# Patient Record
Sex: MALE | Race: WHITE | NOT HISPANIC OR LATINO | Employment: FULL TIME | URBAN - METROPOLITAN AREA
[De-identification: names, ages, dates, MRNs, and addresses within clinical notes are randomized per-mention and may not be internally consistent; named-entity substitution may affect disease eponyms.]

---

## 2023-04-10 LAB
ALANINE AMINOTRANSFERASE (SGPT) (U/L) IN SER/PLAS: 40 U/L (ref 10–52)
ALBUMIN (G/DL) IN SER/PLAS: 4.6 G/DL (ref 3.4–5)
ALKALINE PHOSPHATASE (U/L) IN SER/PLAS: 58 U/L (ref 33–120)
ANION GAP IN SER/PLAS: 11 MMOL/L (ref 10–20)
ASPARTATE AMINOTRANSFERASE (SGOT) (U/L) IN SER/PLAS: 26 U/L (ref 9–39)
BASOPHILS (10*3/UL) IN BLOOD BY AUTOMATED COUNT: 0.01 X10E9/L (ref 0–0.1)
BASOPHILS/100 LEUKOCYTES IN BLOOD BY AUTOMATED COUNT: 0.3 % (ref 0–2)
BILIRUBIN DIRECT (MG/DL) IN SER/PLAS: 0.1 MG/DL (ref 0–0.3)
BILIRUBIN TOTAL (MG/DL) IN SER/PLAS: 0.6 MG/DL (ref 0–1.2)
CALCIUM (MG/DL) IN SER/PLAS: 9.4 MG/DL (ref 8.6–10.6)
CARBON DIOXIDE, TOTAL (MMOL/L) IN SER/PLAS: 25 MMOL/L (ref 21–32)
CHLORIDE (MMOL/L) IN SER/PLAS: 106 MMOL/L (ref 98–107)
CHOLESTEROL (MG/DL) IN SER/PLAS: 177 MG/DL (ref 0–199)
CHOLESTEROL IN HDL (MG/DL) IN SER/PLAS: 71.8 MG/DL
CHOLESTEROL/HDL RATIO: 2.5
CREATININE (MG/DL) IN SER/PLAS: 0.85 MG/DL (ref 0.5–1.3)
EOSINOPHILS (10*3/UL) IN BLOOD BY AUTOMATED COUNT: 0.08 X10E9/L (ref 0–0.7)
EOSINOPHILS/100 LEUKOCYTES IN BLOOD BY AUTOMATED COUNT: 2.2 % (ref 0–6)
ERYTHROCYTE DISTRIBUTION WIDTH (RATIO) BY AUTOMATED COUNT: 12.2 % (ref 11.5–14.5)
ERYTHROCYTE MEAN CORPUSCULAR HEMOGLOBIN CONCENTRATION (G/DL) BY AUTOMATED: 33.9 G/DL (ref 32–36)
ERYTHROCYTE MEAN CORPUSCULAR VOLUME (FL) BY AUTOMATED COUNT: 102 FL (ref 80–100)
ERYTHROCYTES (10*6/UL) IN BLOOD BY AUTOMATED COUNT: 4.25 X10E12/L (ref 4.5–5.9)
GFR MALE: >90 ML/MIN/1.73M2
GLUCOSE (MG/DL) IN SER/PLAS: 82 MG/DL (ref 74–99)
HEMATOCRIT (%) IN BLOOD BY AUTOMATED COUNT: 43.3 % (ref 41–52)
HEMOGLOBIN (G/DL) IN BLOOD: 14.7 G/DL (ref 13.5–17.5)
IMMATURE GRANULOCYTES/100 LEUKOCYTES IN BLOOD BY AUTOMATED COUNT: 0.5 % (ref 0–0.9)
LDL: 97 MG/DL (ref 0–99)
LEUKOCYTES (10*3/UL) IN BLOOD BY AUTOMATED COUNT: 3.6 X10E9/L (ref 4.4–11.3)
LYMPHOCYTES (10*3/UL) IN BLOOD BY AUTOMATED COUNT: 0.94 X10E9/L (ref 1.2–4.8)
LYMPHOCYTES/100 LEUKOCYTES IN BLOOD BY AUTOMATED COUNT: 25.8 % (ref 13–44)
MONOCYTES (10*3/UL) IN BLOOD BY AUTOMATED COUNT: 0.48 X10E9/L (ref 0.1–1)
MONOCYTES/100 LEUKOCYTES IN BLOOD BY AUTOMATED COUNT: 13.2 % (ref 2–10)
NEUTROPHILS (10*3/UL) IN BLOOD BY AUTOMATED COUNT: 2.11 X10E9/L (ref 1.2–7.7)
NEUTROPHILS/100 LEUKOCYTES IN BLOOD BY AUTOMATED COUNT: 58 % (ref 40–80)
NRBC (PER 100 WBCS) BY AUTOMATED COUNT: 0 /100 WBC (ref 0–0)
PARATHYRIN INTACT (PG/ML) IN SER/PLAS: 40.5 PG/ML (ref 18.5–88)
PLATELETS (10*3/UL) IN BLOOD AUTOMATED COUNT: 184 X10E9/L (ref 150–450)
POTASSIUM (MMOL/L) IN SER/PLAS: 4.3 MMOL/L (ref 3.5–5.3)
PROTEIN TOTAL: 7.5 G/DL (ref 6.4–8.2)
SODIUM (MMOL/L) IN SER/PLAS: 138 MMOL/L (ref 136–145)
THYROTROPIN (MIU/L) IN SER/PLAS BY DETECTION LIMIT <= 0.05 MIU/L: 1.29 MIU/L (ref 0.44–3.98)
THYROXINE (T4) FREE (NG/DL) IN SER/PLAS: 1.73 NG/DL (ref 0.78–1.48)
TRIGLYCERIDE (MG/DL) IN SER/PLAS: 41 MG/DL (ref 0–149)
TRIIODOTHYRONINE (T3) FREE (PG/ML) IN SER/PLAS: 4 PG/ML (ref 2.3–4.2)
UREA NITROGEN (MG/DL) IN SER/PLAS: 19 MG/DL (ref 6–23)
VLDL: 8 MG/DL (ref 0–40)

## 2023-04-13 LAB
NIL(NEG) CONTROL SPOT COUNT: NORMAL
PANEL A SPOT COUNT: 0
PANEL B SPOT COUNT: 0
POS CONTROL SPOT COUNT: NORMAL
T-SPOT. TB INTERPRETATION: NEGATIVE

## 2023-04-22 LAB — THYROID STIMULATING IMMUNOGLOBULIN: 3.3 TSI INDEX

## 2023-10-22 PROBLEM — K75.4 AUTOIMMUNE HEPATITIS (MULTI): Status: ACTIVE | Noted: 2023-10-22

## 2023-10-22 PROBLEM — L30.9 DERMATITIS: Status: ACTIVE | Noted: 2023-10-22

## 2023-10-22 PROBLEM — E05.00 GRAVES' ORBITOPATHY: Status: ACTIVE | Noted: 2023-10-22

## 2023-10-22 PROBLEM — E55.9 VITAMIN D DEFICIENCY, UNSPECIFIED: Status: ACTIVE | Noted: 2023-10-22

## 2023-10-22 PROBLEM — K52.9 NONSPECIFIC COLITIS: Status: ACTIVE | Noted: 2023-10-22

## 2023-10-22 PROBLEM — K21.9 GERD (GASTROESOPHAGEAL REFLUX DISEASE): Status: ACTIVE | Noted: 2023-10-22

## 2023-10-22 PROBLEM — R79.89 ELEVATED SERUM CREATININE: Status: ACTIVE | Noted: 2023-10-22

## 2023-10-22 PROBLEM — K51.90 ULCERATIVE COLITIS (MULTI): Status: ACTIVE | Noted: 2023-10-22

## 2023-10-22 PROBLEM — E05.00 GRAVES DISEASE: Status: ACTIVE | Noted: 2023-10-22

## 2023-10-22 PROBLEM — E05.90 HYPERTHYROIDISM: Status: ACTIVE | Noted: 2023-10-22

## 2023-10-22 PROBLEM — R17 JAUNDICE: Status: ACTIVE | Noted: 2023-10-22

## 2023-10-22 PROBLEM — D50.0 IRON DEFICIENCY ANEMIA DUE TO CHRONIC BLOOD LOSS: Status: ACTIVE | Noted: 2023-10-22

## 2023-10-22 PROBLEM — L03.011: Status: ACTIVE | Noted: 2023-10-22

## 2023-10-22 PROBLEM — E89.0 HYPOTHYROIDISM, POSTABLATIVE: Status: ACTIVE | Noted: 2023-10-22

## 2023-10-22 PROBLEM — K52.9 CHRONIC DIARRHEA: Status: ACTIVE | Noted: 2023-10-22

## 2023-10-22 RX ORDER — CHOLECALCIFEROL (VITAMIN D3) 125 MCG
1 CAPSULE ORAL DAILY
COMMUNITY
Start: 2021-09-28 | End: 2024-04-24 | Stop reason: SDUPTHER

## 2023-10-22 RX ORDER — HYDROGEN PEROXIDE 3 %
1 SOLUTION, NON-ORAL MISCELLANEOUS DAILY
COMMUNITY
Start: 2020-11-12

## 2023-10-22 RX ORDER — LEVOTHYROXINE SODIUM 150 UG/1
1 TABLET ORAL DAILY
COMMUNITY
Start: 2022-03-21

## 2023-10-22 RX ORDER — ASCORBIC ACID 125 MG
2 TABLET,CHEWABLE ORAL DAILY
COMMUNITY
Start: 2019-02-12

## 2023-10-22 RX ORDER — PREDNISONE 1 MG/1
3 TABLET ORAL
COMMUNITY
Start: 2022-03-24 | End: 2024-03-18

## 2023-10-22 RX ORDER — ASPIRIN 325 MG
1000 TABLET, DELAYED RELEASE (ENTERIC COATED) ORAL
COMMUNITY
Start: 2019-02-12 | End: 2023-10-24 | Stop reason: WASHOUT

## 2023-10-22 RX ORDER — ACETAMINOPHEN 500 MG
TABLET ORAL
COMMUNITY
End: 2023-10-24 | Stop reason: WASHOUT

## 2023-10-22 RX ORDER — MERCAPTOPURINE 50 MG/1
TABLET ORAL
COMMUNITY
Start: 2020-06-10 | End: 2023-12-11

## 2023-10-22 RX ORDER — LOPERAMIDE HYDROCHLORIDE 2 MG/1
CAPSULE ORAL
COMMUNITY
Start: 2022-07-26 | End: 2023-10-24 | Stop reason: WASHOUT

## 2023-10-24 ENCOUNTER — OFFICE VISIT (OUTPATIENT)
Dept: GASTROENTEROLOGY | Facility: HOSPITAL | Age: 28
End: 2023-10-24
Payer: COMMERCIAL

## 2023-10-24 VITALS
OXYGEN SATURATION: 95 % | HEIGHT: 69 IN | WEIGHT: 188.7 LBS | HEART RATE: 65 BPM | RESPIRATION RATE: 16 BRPM | SYSTOLIC BLOOD PRESSURE: 120 MMHG | DIASTOLIC BLOOD PRESSURE: 79 MMHG | BODY MASS INDEX: 27.95 KG/M2 | TEMPERATURE: 97.6 F

## 2023-10-24 DIAGNOSIS — K51.00 ULCERATIVE PANCOLITIS WITHOUT COMPLICATION (MULTI): Primary | ICD-10-CM

## 2023-10-24 DIAGNOSIS — R10.30 LOWER ABDOMINAL PAIN: ICD-10-CM

## 2023-10-24 PROCEDURE — 1036F TOBACCO NON-USER: CPT | Performed by: INTERNAL MEDICINE

## 2023-10-24 PROCEDURE — 99214 OFFICE O/P EST MOD 30 MIN: CPT | Performed by: INTERNAL MEDICINE

## 2023-10-24 RX ORDER — UPADACITINIB 30 MG/1
TABLET, EXTENDED RELEASE ORAL
COMMUNITY
Start: 2023-09-11 | End: 2023-12-12

## 2023-10-24 SDOH — ECONOMIC STABILITY: FOOD INSECURITY: WITHIN THE PAST 12 MONTHS, YOU WORRIED THAT YOUR FOOD WOULD RUN OUT BEFORE YOU GOT MONEY TO BUY MORE.: NEVER TRUE

## 2023-10-24 SDOH — ECONOMIC STABILITY: FOOD INSECURITY: WITHIN THE PAST 12 MONTHS, THE FOOD YOU BOUGHT JUST DIDN'T LAST AND YOU DIDN'T HAVE MONEY TO GET MORE.: NEVER TRUE

## 2023-10-24 ASSESSMENT — LIFESTYLE VARIABLES
HOW OFTEN DO YOU HAVE A DRINK CONTAINING ALCOHOL: NEVER
AUDIT-C TOTAL SCORE: 0
SKIP TO QUESTIONS 9-10: 1
HOW MANY STANDARD DRINKS CONTAINING ALCOHOL DO YOU HAVE ON A TYPICAL DAY: PATIENT DOES NOT DRINK
HOW OFTEN DO YOU HAVE SIX OR MORE DRINKS ON ONE OCCASION: NEVER

## 2023-10-24 ASSESSMENT — PATIENT HEALTH QUESTIONNAIRE - PHQ9
SUM OF ALL RESPONSES TO PHQ9 QUESTIONS 1 AND 2: 0
2. FEELING DOWN, DEPRESSED OR HOPELESS: NOT AT ALL
1. LITTLE INTEREST OR PLEASURE IN DOING THINGS: NOT AT ALL

## 2023-10-24 ASSESSMENT — PAIN SCALES - GENERAL: PAINLEVEL: 0-NO PAIN

## 2023-10-24 ASSESSMENT — ENCOUNTER SYMPTOMS
OCCASIONAL FEELINGS OF UNSTEADINESS: 0
DEPRESSION: 0
LOSS OF SENSATION IN FEET: 0

## 2023-10-24 NOTE — PATIENT INSTRUCTIONS
Thank you for coming in for follow-up.  It's great that you feel well on Rinvoq.  As discussed today:    1) check labs  2) check stool fecal calprotectin  3) schedule colonoscopy  4) continue Rinvoq 30 mg daily  5) follow-up with Dr. Owne for your autoimmune hepatitis  6) will plan follow-up 6 months after your colonoscopy; call the office with any worsening GI symptoms

## 2023-10-24 NOTE — PROGRESS NOTES
"Subjective   Patient ID: Rubén Hernández is a 28 y.o. male who presents for Ulcerative Colitis    HPI  Follow up of 28 year man with autoimmune hepatitis, listed for a liver transplant since 7/2016 and IBD colitis, felt to be ulcerative colitis (12/2020).    (+) thyroiditis-->Graves disease    5/2020 IBD [facility]GI c/w IBD - Ulcerative Colitis (IFA Perinuclear Pattern Detected and DNAse Sensitive).     12/2020 colonoscopy w/ congestion, erosions, erythema, granularity, and friability throughout the colon. Path showed a chronic active pancolitis.    2/2021 started Entyvio-->only partial response; never achieved disease control on every 4 week Entyvio.     9/2021 changed to infliximab with initial response to induction, but then loss of benefit; dose increased to 10 mg/kg every 8 weeks. Also on 6MP 75 mg daily for liver disease.     7/2022 still symptomatic; Anser IFX 2.6 and FCP 1370 on 10 mg/kg.  Moved to Q6 wks, but never responded.    Changed to Rinvoq 1/2023 and improved.  BMS  3 times/day solid with no nocturnal stools    (+) shingrix vaccine first dose in Dec 22.    In follow-up today, doing well.  \"Everything is a lot better since starting Rinvoq#.  Weight up 9 lbs.  Stools 3 times daily; very close to his pre-disease baseline of 2-3 daily.  No blood in stools.  Stools formed.  No nocturnal stools.  No urgency better.  Eating whatever he wants.  Still on 6MP 75 mg and prednisone 3 mg daily.   On Nexium 20 mg daily; takes for JODY related to thyroxine.    Tolerating Rinvoq without issues.  Has had both ShingRx vaccines.    Review of Systems  Complete review of systems otherwise negative per complaint     Objective   /79   Pulse 65   Temp 36.4 °C (97.6 °F)   Resp 16   Ht 1.74 m (5' 8.5\")   Wt 85.6 kg (188 lb 11.2 oz)   SpO2 95%   BMI 28.27 kg/m²    Vital signs reviewed  Patient alert and oriented in no acute distress  Anicteric  No cervical adenopathy  Cardiac exam regular rate and rhythm S1-S2 without " murmurs gallops or rubs  Lungs clear to auscultation bilaterally  Abdomen soft and nontender without organomegaly or mass.  No rebound or guarding.  Bowel sounds present  Extremities without edema  Neurologically grossly intact     Assessment/Plan   #UC- now on upadacitinib for 10 months and clinicall y in remission.  Will check labs and colonoscopy along with FCP.  Risks of therapy reviewed with him.  Will follow-up 6 months after colonoscopy.    #AIH- continues of low dose prednisone and 6MP 75 mg daily.  Needs F/U with Dr. Woodson

## 2023-11-20 ENCOUNTER — LAB (OUTPATIENT)
Dept: LAB | Facility: LAB | Age: 28
End: 2023-11-20
Payer: COMMERCIAL

## 2023-11-20 DIAGNOSIS — K51.00 ULCERATIVE PANCOLITIS WITHOUT COMPLICATION (MULTI): ICD-10-CM

## 2023-11-20 LAB
ALBUMIN SERPL BCP-MCNC: 4.7 G/DL (ref 3.4–5)
ALP SERPL-CCNC: 54 U/L (ref 33–120)
ALT SERPL W P-5'-P-CCNC: 45 U/L (ref 10–52)
AST SERPL W P-5'-P-CCNC: 31 U/L (ref 9–39)
BASOPHILS # BLD AUTO: 0.02 X10*3/UL (ref 0–0.1)
BASOPHILS NFR BLD AUTO: 0.4 %
BILIRUB DIRECT SERPL-MCNC: 0.1 MG/DL (ref 0–0.3)
BILIRUB SERPL-MCNC: 0.8 MG/DL (ref 0–1.2)
CRP SERPL-MCNC: <0.1 MG/DL
EOSINOPHIL # BLD AUTO: 0.11 X10*3/UL (ref 0–0.7)
EOSINOPHIL NFR BLD AUTO: 2.3 %
ERYTHROCYTE [DISTWIDTH] IN BLOOD BY AUTOMATED COUNT: 13.2 % (ref 11.5–14.5)
HCT VFR BLD AUTO: 46.3 % (ref 41–52)
HGB BLD-MCNC: 15.6 G/DL (ref 13.5–17.5)
IMM GRANULOCYTES # BLD AUTO: 0.02 X10*3/UL (ref 0–0.7)
IMM GRANULOCYTES NFR BLD AUTO: 0.4 % (ref 0–0.9)
LYMPHOCYTES # BLD AUTO: 1.11 X10*3/UL (ref 1.2–4.8)
LYMPHOCYTES NFR BLD AUTO: 23.7 %
MCH RBC QN AUTO: 34.2 PG (ref 26–34)
MCHC RBC AUTO-ENTMCNC: 33.7 G/DL (ref 32–36)
MCV RBC AUTO: 102 FL (ref 80–100)
MONOCYTES # BLD AUTO: 0.48 X10*3/UL (ref 0.1–1)
MONOCYTES NFR BLD AUTO: 10.2 %
NEUTROPHILS # BLD AUTO: 2.95 X10*3/UL (ref 1.2–7.7)
NEUTROPHILS NFR BLD AUTO: 63 %
NRBC BLD-RTO: 0 /100 WBCS (ref 0–0)
PLATELET # BLD AUTO: 211 X10*3/UL (ref 150–450)
PROT SERPL-MCNC: 7.4 G/DL (ref 6.4–8.2)
RBC # BLD AUTO: 4.56 X10*6/UL (ref 4.5–5.9)
WBC # BLD AUTO: 4.7 X10*3/UL (ref 4.4–11.3)

## 2023-11-20 PROCEDURE — 36415 COLL VENOUS BLD VENIPUNCTURE: CPT

## 2023-11-20 PROCEDURE — 86140 C-REACTIVE PROTEIN: CPT

## 2023-11-20 PROCEDURE — 80076 HEPATIC FUNCTION PANEL: CPT

## 2023-11-20 PROCEDURE — 85025 COMPLETE CBC W/AUTO DIFF WBC: CPT

## 2023-12-08 DIAGNOSIS — K75.4 AUTOIMMUNE HEPATITIS (MULTI): ICD-10-CM

## 2023-12-11 RX ORDER — MERCAPTOPURINE 50 MG/1
TABLET ORAL
Qty: 45 TABLET | Refills: 5 | Status: SHIPPED | OUTPATIENT
Start: 2023-12-11

## 2023-12-12 DIAGNOSIS — K51.014: ICD-10-CM

## 2023-12-12 DIAGNOSIS — K51.018 ULCERATIVE PANCOLITIS WITH OTHER COMPLICATION (MULTI): Primary | ICD-10-CM

## 2023-12-12 RX ORDER — UPADACITINIB 30 MG/1
30 TABLET, EXTENDED RELEASE ORAL DAILY
Qty: 90 TABLET | Refills: 1 | Status: SHIPPED | OUTPATIENT
Start: 2023-12-12 | End: 2024-05-24

## 2023-12-19 ENCOUNTER — OFFICE VISIT (OUTPATIENT)
Dept: GASTROENTEROLOGY | Facility: CLINIC | Age: 28
End: 2023-12-19
Payer: COMMERCIAL

## 2023-12-19 VITALS
HEART RATE: 65 BPM | DIASTOLIC BLOOD PRESSURE: 78 MMHG | WEIGHT: 181 LBS | SYSTOLIC BLOOD PRESSURE: 122 MMHG | HEIGHT: 68 IN | TEMPERATURE: 96.3 F | BODY MASS INDEX: 27.43 KG/M2

## 2023-12-19 DIAGNOSIS — K75.4 AUTOIMMUNE HEPATITIS (MULTI): ICD-10-CM

## 2023-12-19 PROCEDURE — 99213 OFFICE O/P EST LOW 20 MIN: CPT | Performed by: INTERNAL MEDICINE

## 2023-12-19 PROCEDURE — 1036F TOBACCO NON-USER: CPT | Performed by: INTERNAL MEDICINE

## 2023-12-19 ASSESSMENT — ENCOUNTER SYMPTOMS
COUGH: 0
APPETITE CHANGE: 0
ABDOMINAL PAIN: 0
SEIZURES: 0
SHORTNESS OF BREATH: 0
RESPIRATORY NEGATIVE: 1
FATIGUE: 0
SPEECH DIFFICULTY: 0

## 2023-12-19 NOTE — PATIENT INSTRUCTIONS
[phone] (Please see Department name below when scheduling)    Please have the following done:    [x]  LABS: due date : May   [x]  IMAGING (Department Radiology) RADIOLOGY: Ultrasound: due date : Now   [x]  Liver Elastography aka Fibroscan due date : Now   Scheduling [phone] (Department Gastro)  Type of liver elastography.  A special ultrasound that measures scarring (fibrosis) and fat (steatosis) in the liver.   The Fibroscan is located at the following locations: [facility], [facility], [facility] (This test is not done in radiology)  PLEASE DO NOT EAT OR DRINK 3 HOURS BEFORE YOUR EXAM  [x]   FOLLOW UP  (Department Gastro): due date :      If you have any questions or need assistance, please don't hesitate to contact us.   Dr. Owen: Office [phone] Fax: [phone]  Hepatology Nurse Coordinator: Sandra MATHEWS [phone]

## 2023-12-19 NOTE — ASSESSMENT & PLAN NOTE
He will continue on current dose of meds for AIH  He will have follow up ultrasound and fibroscan  We will see him in 6 months with blood work

## 2023-12-19 NOTE — PROGRESS NOTES
Subjective    He is doing very well especially from a bowel standpoint. He remains in biochemical remission.    Review of Systems   Constitutional:  Negative for appetite change and fatigue.   HENT: Negative.     Respiratory: Negative.  Negative for cough and shortness of breath.    Gastrointestinal:  Negative for abdominal pain.   Endocrine: Negative for cold intolerance.   Skin: Negative.    Neurological:  Negative for seizures and speech difficulty.       Physical Exam  Constitutional:       Appearance: Normal appearance.   HENT:      Head: Normocephalic and atraumatic.      Nose: No congestion or rhinorrhea.   Cardiovascular:      Rate and Rhythm: Normal rate and regular rhythm.   Pulmonary:      Effort: Pulmonary effort is normal.      Breath sounds: Normal breath sounds.   Abdominal:      General: Abdomen is flat.      Palpations: Abdomen is soft.   Musculoskeletal:         General: No tenderness.      Cervical back: No rigidity or tenderness.      Right lower leg: No edema.   Skin:     Coloration: Skin is not jaundiced.      Findings: No erythema.   Neurological:      General: No focal deficit present.      Mental Status: He is alert. Mental status is at baseline.   Psychiatric:         Mood and Affect: Mood normal.         Behavior: Behavior normal.     === 02/15/22 ===    MR LIVER WO AND W CONTRAST    - Impression -  1.  No left hepatic lobe lesion identified to explain the echogenic  focus seen on the recent ultrasound.  2. Mild liver contour nodularity, in keeping with history of chronic  liver disease. No evidence for portal hypertension.    I personally reviewed the images/study and I agree with resident Dr. Margy Andrew's findings as stated.  Lab Results   Component Value Date    ALT 45 11/20/2023    AST 31 11/20/2023    ALKPHOS 54 11/20/2023    BILITOT 0.8 11/20/2023         Autoimmune hepatitis (CMS/HCC)  He will continue on current dose of meds for AIH  He will have follow up ultrasound and  fibroscan  We will see him in 6 months with blood work

## 2024-01-12 ENCOUNTER — APPOINTMENT (OUTPATIENT)
Dept: ENDOCRINOLOGY | Facility: CLINIC | Age: 29
End: 2024-01-12
Payer: COMMERCIAL

## 2024-01-15 ENCOUNTER — HOSPITAL ENCOUNTER (OUTPATIENT)
Dept: RADIOLOGY | Facility: HOSPITAL | Age: 29
Discharge: HOME | End: 2024-01-15
Payer: COMMERCIAL

## 2024-01-15 DIAGNOSIS — K75.4 AUTOIMMUNE HEPATITIS (MULTI): ICD-10-CM

## 2024-01-15 PROCEDURE — 76705 ECHO EXAM OF ABDOMEN: CPT

## 2024-01-15 PROCEDURE — 76705 ECHO EXAM OF ABDOMEN: CPT | Performed by: RADIOLOGY

## 2024-01-23 ENCOUNTER — APPOINTMENT (OUTPATIENT)
Dept: GASTROENTEROLOGY | Facility: CLINIC | Age: 29
End: 2024-01-23
Payer: COMMERCIAL

## 2024-02-05 DIAGNOSIS — K51.00 ULCERATIVE PANCOLITIS WITHOUT COMPLICATION (MULTI): Primary | ICD-10-CM

## 2024-03-18 DIAGNOSIS — K75.4 AUTOIMMUNE HEPATITIS (MULTI): ICD-10-CM

## 2024-03-18 RX ORDER — PREDNISONE 1 MG/1
4 TABLET ORAL DAILY
Qty: 120 TABLET | Refills: 11 | Status: SHIPPED | OUTPATIENT
Start: 2024-03-18

## 2024-04-08 ENCOUNTER — LAB (OUTPATIENT)
Dept: LAB | Facility: LAB | Age: 29
End: 2024-04-08
Payer: COMMERCIAL

## 2024-04-08 DIAGNOSIS — K75.4 AUTOIMMUNE HEPATITIS TREATED WITH STEROIDS (MULTI): Primary | ICD-10-CM

## 2024-04-08 DIAGNOSIS — K75.4 AUTOIMMUNE HEPATITIS (MULTI): ICD-10-CM

## 2024-04-08 LAB
ALBUMIN SERPL BCP-MCNC: 4.5 G/DL (ref 3.4–5)
ALP SERPL-CCNC: 48 U/L (ref 33–120)
ALT SERPL W P-5'-P-CCNC: 55 U/L (ref 10–52)
ANION GAP SERPL CALC-SCNC: 11 MMOL/L (ref 10–20)
AST SERPL W P-5'-P-CCNC: 30 U/L (ref 9–39)
BASOPHILS # BLD AUTO: 0.01 X10*3/UL (ref 0–0.1)
BASOPHILS NFR BLD AUTO: 0.3 %
BILIRUB DIRECT SERPL-MCNC: 0.2 MG/DL (ref 0–0.3)
BILIRUB SERPL-MCNC: 1.4 MG/DL (ref 0–1.2)
BUN SERPL-MCNC: 14 MG/DL (ref 6–23)
CALCIUM SERPL-MCNC: 9 MG/DL (ref 8.6–10.3)
CHLORIDE SERPL-SCNC: 105 MMOL/L (ref 98–107)
CO2 SERPL-SCNC: 26 MMOL/L (ref 21–32)
CREAT SERPL-MCNC: 0.89 MG/DL (ref 0.5–1.3)
EGFRCR SERPLBLD CKD-EPI 2021: >90 ML/MIN/1.73M*2
EOSINOPHIL # BLD AUTO: 0.09 X10*3/UL (ref 0–0.7)
EOSINOPHIL NFR BLD AUTO: 2.4 %
ERYTHROCYTE [DISTWIDTH] IN BLOOD BY AUTOMATED COUNT: 12.8 % (ref 11.5–14.5)
GLUCOSE SERPL-MCNC: 80 MG/DL (ref 74–99)
HCT VFR BLD AUTO: 44.7 % (ref 41–52)
HGB BLD-MCNC: 15.1 G/DL (ref 13.5–17.5)
IMM GRANULOCYTES # BLD AUTO: 0.01 X10*3/UL (ref 0–0.7)
IMM GRANULOCYTES NFR BLD AUTO: 0.3 % (ref 0–0.9)
LYMPHOCYTES # BLD AUTO: 0.95 X10*3/UL (ref 1.2–4.8)
LYMPHOCYTES NFR BLD AUTO: 24.9 %
MCH RBC QN AUTO: 34.2 PG (ref 26–34)
MCHC RBC AUTO-ENTMCNC: 33.8 G/DL (ref 32–36)
MCV RBC AUTO: 101 FL (ref 80–100)
MONOCYTES # BLD AUTO: 0.69 X10*3/UL (ref 0.1–1)
MONOCYTES NFR BLD AUTO: 18.1 %
NEUTROPHILS # BLD AUTO: 2.06 X10*3/UL (ref 1.2–7.7)
NEUTROPHILS NFR BLD AUTO: 54 %
NRBC BLD-RTO: 0 /100 WBCS (ref 0–0)
PLATELET # BLD AUTO: 203 X10*3/UL (ref 150–450)
POTASSIUM SERPL-SCNC: 4 MMOL/L (ref 3.5–5.3)
PROT SERPL-MCNC: 6.9 G/DL (ref 6.4–8.2)
RBC # BLD AUTO: 4.41 X10*6/UL (ref 4.5–5.9)
SODIUM SERPL-SCNC: 138 MMOL/L (ref 136–145)
WBC # BLD AUTO: 3.8 X10*3/UL (ref 4.4–11.3)

## 2024-04-08 PROCEDURE — 85025 COMPLETE CBC W/AUTO DIFF WBC: CPT

## 2024-04-08 PROCEDURE — 82248 BILIRUBIN DIRECT: CPT

## 2024-04-08 PROCEDURE — 36415 COLL VENOUS BLD VENIPUNCTURE: CPT

## 2024-04-08 PROCEDURE — 80053 COMPREHEN METABOLIC PANEL: CPT

## 2024-04-09 DIAGNOSIS — E05.00 GRAVES DISEASE: Primary | ICD-10-CM

## 2024-04-11 ENCOUNTER — LAB (OUTPATIENT)
Dept: LAB | Facility: LAB | Age: 29
End: 2024-04-11
Payer: COMMERCIAL

## 2024-04-11 DIAGNOSIS — E05.00 GRAVES DISEASE: ICD-10-CM

## 2024-04-11 LAB
ALBUMIN SERPL BCP-MCNC: 4.7 G/DL (ref 3.4–5)
ALP SERPL-CCNC: 51 U/L (ref 33–120)
ALT SERPL W P-5'-P-CCNC: 54 U/L (ref 10–52)
ANION GAP SERPL CALC-SCNC: 13 MMOL/L (ref 10–20)
AST SERPL W P-5'-P-CCNC: 31 U/L (ref 9–39)
BILIRUB SERPL-MCNC: 0.9 MG/DL (ref 0–1.2)
BUN SERPL-MCNC: 16 MG/DL (ref 6–23)
CALCIUM SERPL-MCNC: 9.9 MG/DL (ref 8.6–10.3)
CHLORIDE SERPL-SCNC: 105 MMOL/L (ref 98–107)
CO2 SERPL-SCNC: 26 MMOL/L (ref 21–32)
CREAT SERPL-MCNC: 0.96 MG/DL (ref 0.5–1.3)
EGFRCR SERPLBLD CKD-EPI 2021: >90 ML/MIN/1.73M*2
GLUCOSE SERPL-MCNC: 92 MG/DL (ref 74–99)
POTASSIUM SERPL-SCNC: 4.4 MMOL/L (ref 3.5–5.3)
PROT SERPL-MCNC: 7.3 G/DL (ref 6.4–8.2)
PTH-INTACT SERPL-MCNC: 46.8 PG/ML (ref 18.5–88)
SODIUM SERPL-SCNC: 140 MMOL/L (ref 136–145)
T4 FREE SERPL-MCNC: 1.45 NG/DL (ref 0.61–1.12)
TSH SERPL-ACNC: 0.05 MIU/L (ref 0.44–3.98)

## 2024-04-11 PROCEDURE — 84445 ASSAY OF TSI GLOBULIN: CPT

## 2024-04-11 PROCEDURE — 80053 COMPREHEN METABOLIC PANEL: CPT

## 2024-04-11 PROCEDURE — 84443 ASSAY THYROID STIM HORMONE: CPT

## 2024-04-11 PROCEDURE — 83970 ASSAY OF PARATHORMONE: CPT

## 2024-04-11 PROCEDURE — 84439 ASSAY OF FREE THYROXINE: CPT

## 2024-04-11 PROCEDURE — 36415 COLL VENOUS BLD VENIPUNCTURE: CPT

## 2024-04-12 ENCOUNTER — OFFICE VISIT (OUTPATIENT)
Dept: ENDOCRINOLOGY | Facility: CLINIC | Age: 29
End: 2024-04-12
Payer: COMMERCIAL

## 2024-04-12 VITALS
HEIGHT: 69 IN | WEIGHT: 189 LBS | DIASTOLIC BLOOD PRESSURE: 76 MMHG | HEART RATE: 61 BPM | SYSTOLIC BLOOD PRESSURE: 124 MMHG | BODY MASS INDEX: 27.99 KG/M2

## 2024-04-12 DIAGNOSIS — E05.00 GRAVES DISEASE: Primary | ICD-10-CM

## 2024-04-12 DIAGNOSIS — E03.8 OTHER SPECIFIED HYPOTHYROIDISM: ICD-10-CM

## 2024-04-12 PROCEDURE — 1036F TOBACCO NON-USER: CPT | Performed by: INTERNAL MEDICINE

## 2024-04-12 PROCEDURE — 99214 OFFICE O/P EST MOD 30 MIN: CPT | Performed by: INTERNAL MEDICINE

## 2024-04-12 ASSESSMENT — PAIN SCALES - GENERAL: PAINLEVEL: 0-NO PAIN

## 2024-04-12 NOTE — PROGRESS NOTES
29 year old patient with medical history significant for Ulcerative Colitis [in remission], Autoimmune Hepatitis (dx 2016 currently managed with prednisone 3mg daily and mercaptopurine?), and hx of Graves disease s/p VILLAVICENCIO ablation and post ablative hypothyroidism, Graves' orbitopathy well-controlled, osteopenia.    Presents today for a routine follow up. Last OV was in April 2023.      Interval Hx :  --------------  The patient reports feeling well overall and has further improvement in his eye sx; minimal watering.   Denies itching or redness of the eyes.   Denies any changes in vision, visual deficits or double vision.   Using eye drops (gell at night and PRN during the day) , sleeping propped up, avoiding salt and smoke, using sunglasses outdoors.  Patient reports having good energy, denies palpitations excessive sweating tremors or muscle cramps.  No diarrhea ,weight is stable.     Reports compliance with levothyroxine 150 mcg qd at appropriate time of the day. (took T4 1.5-2 hours before the test on April 11)  Taking Vitamin D dose: 5000 units once daily.        Pertinent History of Thyroid Disease:  -----------------------------------------  Briefly:  Patient was diagnosed with primary hypothyroidism without elevated TPO antibodies in 2018 and was started on levothyroxine replacement therapy. He subsequently developed hyperthyroidism with elevated TSI antibodies in February 2020 without eye symptoms. Initiation of methimazole for Graves disease lead to acute transaminitis requiring discontinuation of methimazole followed by radioactive iodine ablation therapy on 5/28/20.     Detailed history:  03/2018: Diagnosed with primary hypothyroidism during inpatient admission for facial swelling. Labs at that time showed TSH of 125.27 with free T4 of less than 0.10 ng/dL (TPO antibody level was 21 IU/ml and TSI was less than 1.0). He was started initially on levothyroxine 125 mcg, dose was titrated up to 150 mcg daily in  June 2018.     02/2020: TFTs showing a TSH: 0.01, and FT4:1.81. KU3ncbc decreased from 125 to 88mcg PO QD.     03/2020: TSH: <0.01, with elevated FT4 of 2.88. Patient symptomatic insomnia, frequent bowel movements and racing heart/palpitations. Levothyroxine was discontinued.     04/2020: Despite discontinuation of LT4 patient remained clinically and biochemically hyperthyroid. TSH of 0.01 and elevated free T4 of 2.23 ng/dL. Patient was started on methimazole 10 mg BID during the last week of April 2020.   Shortly after starting MMI liver enzymes were noted to be elevated, and continued to trend up after repeat testing. Methimazole was discontinued approximately on May 5, 2020 (about 2-3 weeks after starting the medication).     05/2020: Patient admitted (5/18/20) due to worsening transaminitis; patient however remained asymptomatic. Diagnostic radioactive iodine uptake and scan was done on 5/21/20 that showed homogenous increased activity throughout the thyroid gland bilaterally with uptake of 27.4%. Liver biopsy done during recent hospitalization showed evidence of drug induced hepatotoxicity. He was started on IV glucocorticoids and he was discharged home on prednisone 80 mg daily. After discharge patient underwent therapeutic VILLAVICENCIO ablation (5/28/20); with 21.4 millicuries.      07/2020: Post VILLAVICENCIO ablative therapy TFTs showed TSH of 90.11 mIU/L and free T4 of 0.39 ng/dL and patient was started on levothyroxine 112 mcg daily on 7/8/20.      08/2020: Repeat TFTs: TSH: 16, FT4:1.16, LT4 increased to 150mcg PO daily.     10/2020: F/u in clinic, continued on LT4 150mcg PO daily. TFT: TSH:0.41, FT4:1.2     Social Hx:  - .  - Lives with girlfriend.  - Denies any ETOH, Tobacco, or recreational drug use.  - Walks 1/2 to 1 mile daily.       Current Outpatient Medications on File Prior to Visit   Medication Sig Dispense Refill    ascorbic acid (Vitamin C) 125 mg chewable tablet Chew 2 tablets (250 mg) once  "daily.      cholecalciferol (Vitamin D-3) 125 MCG (5000 UT) capsule Take 1 capsule (125 mcg) by mouth once daily.      esomeprazole (NexIUM) 20 mg DR capsule Take 1 capsule (20 mg) by mouth once daily.      levothyroxine (Synthroid, Levoxyl) 150 mcg tablet Take 1 tablet (150 mcg) by mouth once daily.      mercaptopurine (Purinethol) 50 mg tablet TAKE 1 AND 1/2 TABLETS BY MOUTH DAILY 45 tablet 5    predniSONE (Deltasone) 1 mg tablet TAKE 4 TABLETS BY MOUTH EVERY  tablet 11    Rinvoq 30 mg tablet extended release 24 hr tablet TAKE 1 TABLET DAILY 90 tablet 1     No current facility-administered medications on file prior to visit.        Visit Vitals  /76   Pulse 61   Ht 1.753 m (5' 9\")   Wt 85.7 kg (189 lb)   BMI 27.91 kg/m²   Smoking Status Never   BSA 2.04 m²        PE:  Constitutional: well-developed, well-nourished,  , in no acute distress   Skin: Skin is appropriately warm. Skin color is normal with no suspicious lesions or rashes   Eyes: no lid retraction, no chemosis, right globe more soft to palpation, left globe soft to palpation, no tenderness of globes, EOMI, visual field testing intact, Exophthalmometer measurements : right 21mm, left 22mm ; base: 111 , stable  Neck Thyroid: Not palpated   Pulmonary: Clear to auscultation bilaterally.   Cardiovascular: Regular rate and rhythm. Normal S1 and S2, no gallops, no murmurs   Musculoskeletal: No proximal muscle weakness   Neurologic : Moving all extremities spontaneously. No balance or gait disturbances.  Fine  tremors of outstretched hands  Deep tendon reflexes are normal and without delay in the return phase; positive Chvostek's sign     Labs:  Lab Results   Component Value Date    TSH 0.05 (L) 04/11/2024    FREET4 1.45 (H) 04/11/2024    T3FREE 4.0 04/10/2023    N7VBJOL 85 07/06/2020    RECE 21 (H) 10/22/2021    THYROIDPAB 21 03/29/2018    TSI 3.3 (H) 04/10/2023        Imaging:  -Bone density 12/2022:  Interpretation:  The patient has " osteopenia as determined by WHO criteria.    Based on the results of the patient's bone density assessment,   the risk of fracture increases approximately two fold for each   1.0 SD decrease in T-score.      However, low BMD is not the only risk for fracture.  Other   clinical risk factors for osteoporotic fracture should be   considered in ascertaining this patient's future fracture risk   including the patient's age, previous osteoporotic (fragility)   fracture, estrogen deficiency/hypogonadal, risk of falling, use   of medications implicated in bone loss (glucocorticoids),   family history of osteoporotic fracture, diseases and   conditions associated with bone loss, low body weight, smoking,   high bone turnover, etc.  Combining low BMD and other clinical   risk factors result in a more precise assessment of future   fracture risk.  Secondary causes of osteoporosis, such as   osteomalacia, other metabolic bone disorders, and diseases and   conditions that may contribute to accelerated bone loss may   have to be considered depending on the clinical situation.      A repeat bone density should be considered in two years.    Assessment/plan:  --------------------  29 year old patient with medical history significant for Ulcerative Colitis [in remission], Autoimmune Hepatitis (dx 2016 currently managed with prednisone 3mg daily and mercaptopurine?), and hx of Graves disease s/p VILLAVICENCIO ablation and post ablative hypothyroidism, Graves' orbitopathy well-controlled, osteopenia.    Presents today for a routine follow up.  Clinically and biochemically mildly hyperthyroid  -TSI pending  -would decrease Lt4 from 150mg daily x7 to 150mg 6.5 days per week  -repeat TFTs in 3+6 months (check TSI)  -Bone scan due this in 1 year  -follow up with Dr Owen on LFTs (up trending gradually) with  -RTC in 6 months    Patient see and examined, case discussed with Dr. Rosario        Doing OK; Grave's orbitopathy is stable; clinically  euthyroid. No change in meds; return in 9 months;

## 2024-04-17 LAB — TSI SER-ACNC: 2.5 TSI INDEX

## 2024-04-24 DIAGNOSIS — E55.9 VITAMIN D DEFICIENCY, UNSPECIFIED: ICD-10-CM

## 2024-04-24 RX ORDER — CHOLECALCIFEROL (VITAMIN D3) 125 MCG
125 CAPSULE ORAL DAILY
Qty: 90 CAPSULE | Refills: 3 | Status: SHIPPED | OUTPATIENT
Start: 2024-04-24

## 2024-05-03 ENCOUNTER — LAB (OUTPATIENT)
Dept: LAB | Facility: LAB | Age: 29
End: 2024-05-03
Payer: COMMERCIAL

## 2024-05-03 DIAGNOSIS — K75.4 AUTOIMMUNE HEPATITIS TREATED WITH STEROIDS (MULTI): ICD-10-CM

## 2024-05-03 DIAGNOSIS — K75.4 AUTOIMMUNE HEPATITIS (MULTI): ICD-10-CM

## 2024-05-03 LAB
ALBUMIN SERPL BCP-MCNC: 4.5 G/DL (ref 3.4–5)
ALP SERPL-CCNC: 58 U/L (ref 33–120)
ALT SERPL W P-5'-P-CCNC: 57 U/L (ref 10–52)
ANION GAP SERPL CALC-SCNC: 12 MMOL/L (ref 10–20)
AST SERPL W P-5'-P-CCNC: 28 U/L (ref 9–39)
BASOPHILS # BLD AUTO: 0.02 X10*3/UL (ref 0–0.1)
BASOPHILS NFR BLD AUTO: 0.5 %
BILIRUB DIRECT SERPL-MCNC: 0.1 MG/DL (ref 0–0.3)
BILIRUB SERPL-MCNC: 0.8 MG/DL (ref 0–1.2)
BUN SERPL-MCNC: 10 MG/DL (ref 6–23)
CALCIUM SERPL-MCNC: 9.3 MG/DL (ref 8.6–10.3)
CHLORIDE SERPL-SCNC: 107 MMOL/L (ref 98–107)
CO2 SERPL-SCNC: 25 MMOL/L (ref 21–32)
CREAT SERPL-MCNC: 0.92 MG/DL (ref 0.5–1.3)
EGFRCR SERPLBLD CKD-EPI 2021: >90 ML/MIN/1.73M*2
EOSINOPHIL # BLD AUTO: 0.09 X10*3/UL (ref 0–0.7)
EOSINOPHIL NFR BLD AUTO: 2.4 %
ERYTHROCYTE [DISTWIDTH] IN BLOOD BY AUTOMATED COUNT: 13 % (ref 11.5–14.5)
GLUCOSE SERPL-MCNC: 82 MG/DL (ref 74–99)
HCT VFR BLD AUTO: 41.8 % (ref 41–52)
HGB BLD-MCNC: 14.6 G/DL (ref 13.5–17.5)
IMM GRANULOCYTES # BLD AUTO: 0.02 X10*3/UL (ref 0–0.7)
IMM GRANULOCYTES NFR BLD AUTO: 0.5 % (ref 0–0.9)
LYMPHOCYTES # BLD AUTO: 0.83 X10*3/UL (ref 1.2–4.8)
LYMPHOCYTES NFR BLD AUTO: 22 %
MCH RBC QN AUTO: 35.1 PG (ref 26–34)
MCHC RBC AUTO-ENTMCNC: 34.9 G/DL (ref 32–36)
MCV RBC AUTO: 101 FL (ref 80–100)
MONOCYTES # BLD AUTO: 0.52 X10*3/UL (ref 0.1–1)
MONOCYTES NFR BLD AUTO: 13.8 %
NEUTROPHILS # BLD AUTO: 2.29 X10*3/UL (ref 1.2–7.7)
NEUTROPHILS NFR BLD AUTO: 60.8 %
NRBC BLD-RTO: 0 /100 WBCS (ref 0–0)
PLATELET # BLD AUTO: 196 X10*3/UL (ref 150–450)
POTASSIUM SERPL-SCNC: 4 MMOL/L (ref 3.5–5.3)
PROT SERPL-MCNC: 6.9 G/DL (ref 6.4–8.2)
RBC # BLD AUTO: 4.16 X10*6/UL (ref 4.5–5.9)
SODIUM SERPL-SCNC: 140 MMOL/L (ref 136–145)
WBC # BLD AUTO: 3.8 X10*3/UL (ref 4.4–11.3)

## 2024-05-03 PROCEDURE — 85025 COMPLETE CBC W/AUTO DIFF WBC: CPT

## 2024-05-03 PROCEDURE — 36415 COLL VENOUS BLD VENIPUNCTURE: CPT

## 2024-05-03 PROCEDURE — 80053 COMPREHEN METABOLIC PANEL: CPT

## 2024-05-03 PROCEDURE — 82248 BILIRUBIN DIRECT: CPT

## 2024-05-24 DIAGNOSIS — K51.018 ULCERATIVE PANCOLITIS WITH OTHER COMPLICATION (MULTI): ICD-10-CM

## 2024-05-24 RX ORDER — UPADACITINIB 30 MG/1
30 TABLET, EXTENDED RELEASE ORAL DAILY
Qty: 90 TABLET | Refills: 1 | Status: SHIPPED | OUTPATIENT
Start: 2024-05-24

## 2024-06-12 ENCOUNTER — HOSPITAL ENCOUNTER (OUTPATIENT)
Dept: GASTROENTEROLOGY | Facility: HOSPITAL | Age: 29
Setting detail: OUTPATIENT SURGERY
Discharge: HOME | End: 2024-06-12
Payer: COMMERCIAL

## 2024-06-12 VITALS
OXYGEN SATURATION: 95 % | TEMPERATURE: 98.2 F | SYSTOLIC BLOOD PRESSURE: 120 MMHG | DIASTOLIC BLOOD PRESSURE: 76 MMHG | HEART RATE: 61 BPM | BODY MASS INDEX: 27.28 KG/M2 | HEIGHT: 68 IN | RESPIRATION RATE: 13 BRPM | WEIGHT: 180 LBS

## 2024-06-12 DIAGNOSIS — K51.00 ULCERATIVE PANCOLITIS WITHOUT COMPLICATION (MULTI): Primary | ICD-10-CM

## 2024-06-12 PROCEDURE — 45380 COLONOSCOPY AND BIOPSY: CPT | Performed by: INTERNAL MEDICINE

## 2024-06-12 PROCEDURE — 7100000009 HC PHASE TWO TIME - INITIAL BASE CHARGE

## 2024-06-12 PROCEDURE — 3700000012 HC SEDATION LEVEL 5+ TIME - INITIAL 15 MINUTES 5/> YEARS

## 2024-06-12 PROCEDURE — 2500000004 HC RX 250 GENERAL PHARMACY W/ HCPCS (ALT 636 FOR OP/ED): Performed by: INTERNAL MEDICINE

## 2024-06-12 PROCEDURE — 7100000010 HC PHASE TWO TIME - EACH INCREMENTAL 1 MINUTE

## 2024-06-12 PROCEDURE — 99152 MOD SED SAME PHYS/QHP 5/>YRS: CPT | Performed by: INTERNAL MEDICINE

## 2024-06-12 RX ORDER — ONDANSETRON HYDROCHLORIDE 2 MG/ML
4 INJECTION, SOLUTION INTRAVENOUS ONCE AS NEEDED
Status: CANCELLED | OUTPATIENT
Start: 2024-06-12

## 2024-06-12 RX ORDER — SODIUM CHLORIDE, SODIUM LACTATE, POTASSIUM CHLORIDE, CALCIUM CHLORIDE 600; 310; 30; 20 MG/100ML; MG/100ML; MG/100ML; MG/100ML
20 INJECTION, SOLUTION INTRAVENOUS CONTINUOUS
Status: DISCONTINUED | OUTPATIENT
Start: 2024-06-12 | End: 2024-06-13 | Stop reason: HOSPADM

## 2024-06-12 RX ORDER — FENTANYL CITRATE 50 UG/ML
INJECTION, SOLUTION INTRAMUSCULAR; INTRAVENOUS AS NEEDED
Status: COMPLETED | OUTPATIENT
Start: 2024-06-12 | End: 2024-06-12

## 2024-06-12 RX ORDER — MIDAZOLAM HYDROCHLORIDE 1 MG/ML
INJECTION, SOLUTION INTRAMUSCULAR; INTRAVENOUS AS NEEDED
Status: COMPLETED | OUTPATIENT
Start: 2024-06-12 | End: 2024-06-12

## 2024-06-12 ASSESSMENT — PAIN SCALES - GENERAL

## 2024-06-12 ASSESSMENT — PATIENT HEALTH QUESTIONNAIRE - PHQ9
1. LITTLE INTEREST OR PLEASURE IN DOING THINGS: NOT AT ALL
SUM OF ALL RESPONSES TO PHQ9 QUESTIONS 1 AND 2: 0
2. FEELING DOWN, DEPRESSED OR HOPELESS: NOT AT ALL

## 2024-06-12 ASSESSMENT — ENCOUNTER SYMPTOMS
OCCASIONAL FEELINGS OF UNSTEADINESS: 0
DEPRESSION: 0
LOSS OF SENSATION IN FEET: 0

## 2024-06-12 ASSESSMENT — COLUMBIA-SUICIDE SEVERITY RATING SCALE - C-SSRS
6. HAVE YOU EVER DONE ANYTHING, STARTED TO DO ANYTHING, OR PREPARED TO DO ANYTHING TO END YOUR LIFE?: NO
1. IN THE PAST MONTH, HAVE YOU WISHED YOU WERE DEAD OR WISHED YOU COULD GO TO SLEEP AND NOT WAKE UP?: NO
2. HAVE YOU ACTUALLY HAD ANY THOUGHTS OF KILLING YOURSELF?: NO

## 2024-06-12 NOTE — H&P
"History Of Present Illness  Rubén Hernández is a 29 y.o. male presenting with ulcerative colitis on Rinvoq for surveillance colonoscopy.     Past Medical History  Past Medical History:   Diagnosis Date    Autoimmune hepatitis (Multi)     Autoimmune hepatitis treated with steroids    Encounter for screening for osteoporosis     Osteoporosis screening     Surgical History  Past Surgical History:   Procedure Laterality Date    IR VENOGRAM HEPATIC  6/20/2016    IR VENOGRAM HEPATIC 6/20/2016 [facility] INPATIENT LEGACY    OTHER SURGICAL HISTORY  04/06/2021    Shoulder surgery    US GUIDED NEEDLE LIVER BIOPSY  5/19/2020    US GUIDED NEEDLE LIVER BIOPSY 5/19/2020 [facility] AIB LEGACY     Social History  He reports that he has never smoked. He has never been exposed to tobacco smoke. He has never used smokeless tobacco. He reports that he does not currently use alcohol. He reports that he does not use drugs.    Family History  No family history on file.     Allergies  No Known Allergies  Review of Systems  Pre-sedation Evaluation:  ASA Classification - ASA 2 - Patient with mild systemic disease with no functional limitations  Mallampati Score - II (hard and soft palate, upper portion of tonsils and uvula visible)    Physical Exam   Vital signs reviewed  Patient alert and oriented in no acute distress  Anicteric  Cardiac exam regular rate and rhythm S1-S2 without murmurs gallops or rubs  Lungs clear to auscultation bilaterally  Abdomen soft and nontender     Last Recorded Vitals  Blood pressure 151/89, pulse 60, temperature 36.9 °C (98.4 °F), temperature source Temporal, resp. rate 16, height 1.727 m (5' 8\"), weight 81.6 kg (180 lb), SpO2 98%.     Assessment/Plan   UC for surveillance colonoscopy examination     PTA/Current Medications:  (Not in a hospital admission)    Current Outpatient Medications   Medication Sig Dispense Refill    ascorbic acid (Vitamin C) 125 mg chewable tablet Chew 2 tablets (250 mg) once daily.      " cholecalciferol (Vitamin D-3) 125 MCG (5000 UT) capsule Take 1 capsule (125 mcg) by mouth once daily. 90 capsule 3    esomeprazole (NexIUM) 20 mg DR capsule Take 1 capsule (20 mg) by mouth once daily.      levothyroxine (Synthroid, Levoxyl) 150 mcg tablet Take 1 tablet (150 mcg) by mouth once daily.      mercaptopurine (Purinethol) 50 mg tablet TAKE 1 AND 1/2 TABLETS BY MOUTH DAILY 45 tablet 5    predniSONE (Deltasone) 1 mg tablet TAKE 4 TABLETS BY MOUTH EVERY  tablet 11    Rinvoq 30 mg tablet extended release 24 hr tablet TAKE 1 TABLET DAILY 90 tablet 1     Current Facility-Administered Medications   Medication Dose Route Frequency Provider Last Rate Last Admin    lactated Ringer's infusion  20 mL/hr intravenous Continuous MD Tu Zimmerman MD

## 2024-06-14 DIAGNOSIS — K75.4 AUTOIMMUNE HEPATITIS (MULTI): ICD-10-CM

## 2024-06-14 RX ORDER — MERCAPTOPURINE 50 MG/1
75 TABLET ORAL
Qty: 45 TABLET | Refills: 11 | Status: SHIPPED | OUTPATIENT
Start: 2024-06-14 | End: 2025-06-14

## 2024-06-25 DIAGNOSIS — E89.0 HYPOTHYROIDISM, POSTABLATIVE: ICD-10-CM

## 2024-06-25 LAB
LABORATORY COMMENT REPORT: NORMAL
PATH REPORT.FINAL DX SPEC: NORMAL
PATH REPORT.GROSS SPEC: NORMAL
PATH REPORT.TOTAL CANCER: NORMAL

## 2024-06-25 RX ORDER — LEVOTHYROXINE SODIUM 150 UG/1
TABLET ORAL
Qty: 85 TABLET | Refills: 3 | Status: SHIPPED | OUTPATIENT
Start: 2024-06-25

## 2024-07-15 NOTE — PATIENT INSTRUCTIONS
[phone]   (Please see Department name below when scheduling)    Please have the following done:    [x]  LABS Due : Nov    [x]  Liver Elastography aka Fibroscan (Department Gastro) (This test is not done in radiology) Due : First available  Type of liver elastography.  A special ultrasound that measures scarring (fibrosis) and fat (steatosis) in the liver.   The Fibroscan is located at 3 locations:   [facility], [facility]), [facility]  PLEASE DO NOT EAT OR DRINK 3 HOURS BEFORE YOUR EXAM  [x]   FOLLOW UP  (Department Gastro): Due : Connor    If you have any questions or need assistance, please don't hesitate to contact us.   Dr. Owen: Office [phone] Fax: [phone]  Hepatology Nurse Coordinator: Sandra MATHEWS [phone]

## 2024-07-16 ENCOUNTER — OFFICE VISIT (OUTPATIENT)
Dept: GASTROENTEROLOGY | Facility: CLINIC | Age: 29
End: 2024-07-16
Payer: COMMERCIAL

## 2024-07-16 ENCOUNTER — APPOINTMENT (OUTPATIENT)
Dept: GASTROENTEROLOGY | Facility: HOSPITAL | Age: 29
End: 2024-07-16
Payer: COMMERCIAL

## 2024-07-16 VITALS
WEIGHT: 190 LBS | RESPIRATION RATE: 16 BRPM | HEART RATE: 62 BPM | TEMPERATURE: 97.7 F | DIASTOLIC BLOOD PRESSURE: 84 MMHG | BODY MASS INDEX: 28.14 KG/M2 | SYSTOLIC BLOOD PRESSURE: 124 MMHG | HEIGHT: 69 IN

## 2024-07-16 DIAGNOSIS — K75.4 AUTOIMMUNE HEPATITIS (MULTI): ICD-10-CM

## 2024-07-16 PROCEDURE — 1036F TOBACCO NON-USER: CPT | Performed by: INTERNAL MEDICINE

## 2024-07-16 PROCEDURE — 99213 OFFICE O/P EST LOW 20 MIN: CPT | Performed by: INTERNAL MEDICINE

## 2024-07-16 ASSESSMENT — ENCOUNTER SYMPTOMS
NAUSEA: 0
WHEEZING: 0
JOINT SWELLING: 0
VOMITING: 0
DIARRHEA: 0
TROUBLE SWALLOWING: 0
COUGH: 0
FEVER: 0
DIZZINESS: 0
LIGHT-HEADEDNESS: 0
HEADACHES: 0
UNEXPECTED WEIGHT CHANGE: 0
ARTHRALGIAS: 0
ABDOMINAL PAIN: 0
SPEECH DIFFICULTY: 0
DIFFICULTY URINATING: 0
CHILLS: 0
CONSTIPATION: 0
SHORTNESS OF BREATH: 0
ABDOMINAL DISTENTION: 0
COLOR CHANGE: 0
SLEEP DISTURBANCE: 0
CONFUSION: 0

## 2024-07-16 ASSESSMENT — PAIN SCALES - GENERAL: PAINLEVEL: 0-NO PAIN

## 2024-07-16 NOTE — PROGRESS NOTES
Subjective  He remains well and symptoms of IBD are well controlled on rinvoq. He has trivial elevation in ALT but labs are otherwise normal on 6MP and pred.       Review of Systems   Constitutional:  Negative for chills, fever and unexpected weight change.   HENT:  Negative for congestion and trouble swallowing.    Respiratory:  Negative for cough, shortness of breath and wheezing.    Cardiovascular:  Negative for chest pain.   Gastrointestinal:  Negative for abdominal distention, abdominal pain, constipation, diarrhea, nausea and vomiting.   Genitourinary:  Negative for difficulty urinating.   Musculoskeletal:  Negative for arthralgias and joint swelling.   Skin:  Negative for color change.   Neurological:  Negative for dizziness, speech difficulty, light-headedness and headaches.   Psychiatric/Behavioral:  Negative for confusion and sleep disturbance.        Physical Exam  Constitutional:       General: He is awake.      Appearance: Normal appearance.   HENT:      Head: Normocephalic and atraumatic.      Nose: Nose normal.      Mouth/Throat:      Mouth: Mucous membranes are moist.   Eyes:      Pupils: Pupils are equal, round, and reactive to light.   Neck:      Thyroid: No thyroid mass.      Trachea: Phonation normal.   Cardiovascular:      Rate and Rhythm: Normal rate and regular rhythm.      Heart sounds: Normal heart sounds. No murmur heard.     No gallop.   Pulmonary:      Effort: Pulmonary effort is normal. No respiratory distress.      Breath sounds: Normal air entry. Rales present. No decreased breath sounds, wheezing or rhonchi.   Abdominal:      General: Bowel sounds are normal. There is no distension.      Palpations: Abdomen is soft.      Tenderness: There is no abdominal tenderness.   Musculoskeletal:      Cervical back: Neck supple.      Right lower leg: No edema.      Left lower leg: No edema.   Skin:     General: Skin is warm.      Capillary Refill: Capillary refill takes less than 2 seconds.  "  Neurological:      General: No focal deficit present.      Mental Status: He is alert and oriented to person, place, and time. Mental status is at baseline.      Cranial Nerves: Cranial nerves 2-12 are intact.      Motor: Motor function is intact.   Psychiatric:         Attention and Perception: Attention and perception normal.         Mood and Affect: Mood normal.         Speech: Speech normal.         Behavior: Behavior normal.       LABS:   Lab Results   Component Value Date    ALBUMIN 4.5 05/03/2024    BILITOT 0.8 05/03/2024    BILIDIR 0.1 05/03/2024    ALKPHOS 58 05/03/2024    ALT 57 (H) 05/03/2024    AST 28 05/03/2024    PROT 6.9 05/03/2024    HAVTO REACTIVE (A) 01/05/2021    HEPBSAB 110.7 01/05/2021    HEPBCAB NONREACTIVE 01/05/2021    HEPBSAG NONREACTIVE 01/05/2021    INR 1.0 06/10/2022    FERRITIN 15 (L) 01/03/2022    IRON 44 01/03/2022    IRONSAT 13 (L) 01/03/2022      No results found for: \"AFP\"   Lab Results   Component Value Date    ALBUMIN 4.5 05/03/2024    BILITOT 0.8 05/03/2024    BILIDIR 0.1 05/03/2024    ALKPHOS 58 05/03/2024    ALT 57 (H) 05/03/2024    AST 28 05/03/2024    PROT 6.9 05/03/2024      Lab Results   Component Value Date    WBC 3.8 (L) 05/03/2024    HGB 14.6 05/03/2024    HCT 41.8 05/03/2024     (H) 05/03/2024     05/03/2024     Lab Results   Component Value Date    GLUCOSE 82 05/03/2024    CALCIUM 9.3 05/03/2024     05/03/2024    K 4.0 05/03/2024    CO2 25 05/03/2024     05/03/2024    BUN 10 05/03/2024    CREATININE 0.92 05/03/2024     Lab Results   Component Value Date    INR 1.0 06/10/2022        === 01/15/24 ===    US ABDOMEN LIMITED LIVER    - Impression -  Contracted gallbladder with mild gallbladder wall thickening. No  obvious cholelithiasis.    2 small circumscribed echogenic lesions of the liver measuring up to  1 cm, nonspecific, however statistically likely representing  hemangiomas. CT or MRI hepatic mass protocol may be obtained for  further " assessment.    MACRO:  None    Signed by: Compa Rm 1/15/2024 1:51 PM  Dictation workstation:   [id number]   Autoimmune hepatitis (Multi)  He will remain on current meds  We will see him in 6 months  We will order fibroscan again to assess fibrosis stage at this point

## 2024-07-16 NOTE — ASSESSMENT & PLAN NOTE
He will remain on current meds  We will see him in 6 months  We will order fibroscan again to assess fibrosis stage at this point  
Stable.

## 2024-10-11 ENCOUNTER — LAB (OUTPATIENT)
Dept: LAB | Facility: LAB | Age: 29
End: 2024-10-11
Payer: COMMERCIAL

## 2024-10-11 DIAGNOSIS — E03.8 OTHER SPECIFIED HYPOTHYROIDISM: ICD-10-CM

## 2024-10-11 DIAGNOSIS — E05.00 GRAVES DISEASE: ICD-10-CM

## 2024-10-11 LAB
T4 FREE SERPL-MCNC: 1.33 NG/DL (ref 0.61–1.12)
TSH SERPL-ACNC: 0.95 MIU/L (ref 0.44–3.98)

## 2024-10-11 PROCEDURE — 84445 ASSAY OF TSI GLOBULIN: CPT

## 2024-10-11 PROCEDURE — 84443 ASSAY THYROID STIM HORMONE: CPT

## 2024-10-11 PROCEDURE — 36415 COLL VENOUS BLD VENIPUNCTURE: CPT

## 2024-10-11 PROCEDURE — 84439 ASSAY OF FREE THYROXINE: CPT

## 2024-10-16 ENCOUNTER — APPOINTMENT (OUTPATIENT)
Dept: ENDOCRINOLOGY | Facility: CLINIC | Age: 29
End: 2024-10-16
Payer: COMMERCIAL

## 2024-10-16 VITALS
DIASTOLIC BLOOD PRESSURE: 82 MMHG | HEIGHT: 69 IN | HEART RATE: 66 BPM | SYSTOLIC BLOOD PRESSURE: 135 MMHG | BODY MASS INDEX: 28.14 KG/M2 | WEIGHT: 190 LBS

## 2024-10-16 DIAGNOSIS — E05.00 GRAVES DISEASE: Primary | ICD-10-CM

## 2024-10-16 PROCEDURE — 99214 OFFICE O/P EST MOD 30 MIN: CPT | Performed by: INTERNAL MEDICINE

## 2024-10-16 PROCEDURE — 3008F BODY MASS INDEX DOCD: CPT | Performed by: INTERNAL MEDICINE

## 2024-10-16 ASSESSMENT — PAIN SCALES - GENERAL: PAINLEVEL_OUTOF10: 0-NO PAIN

## 2024-10-16 NOTE — PROGRESS NOTES
Chief Complaints    Follow up on Grave's disease       HPI    29 year old patient with medical history significant for Ulcerative Colitis [in remission], Autoimmune Hepatitis (dx 2016 currently managed with prednisone 3mg daily and mercaptopurine?), and hx of Graves disease s/p VILLAVICENCIO ablation and post ablative hypothyroidism, Graves' orbitopathy well-controlled, osteopenia.     Presents today for a routine follow up. Last OV was in April 2024.       Background Hx    03/2018: Diagnosed with primary hypothyroidism during inpatient admission for facial swelling. Labs at that time showed TSH of 125.27 with free T4 of less than 0.10 ng/dL (TPO antibody level was 21 IU/ml and TSI was less than 1.0). He was started initially on levothyroxine 125 mcg, dose was titrated up to 150 mcg daily in June 2018.     02/2020: TFTs showing a TSH: 0.01, and FT4:1.81. EO8jcxg decreased from 125 to 88mcg PO QD.     03/2020: TSH: <0.01, with elevated FT4 of 2.88. Patient symptomatic insomnia, frequent bowel movements and racing heart/palpitations. Levothyroxine was discontinued.     04/2020: Despite discontinuation of LT4 patient remained clinically and biochemically hyperthyroid. TSH of 0.01 and elevated free T4 of 2.23 ng/dL. Patient was started on methimazole 10 mg BID during the last week of April 2020.   Shortly after starting MMI liver enzymes were noted to be elevated, and continued to trend up after repeat testing. Methimazole was discontinued approximately on May 5, 2020 (about 2-3 weeks after starting the medication).     05/2020: Patient admitted (5/18/20) due to worsening transaminitis; patient however remained asymptomatic. Diagnostic radioactive iodine uptake and scan was done on 5/21/20 that showed homogenous increased activity throughout the thyroid gland bilaterally with uptake of 27.4%. Liver biopsy done during recent hospitalization showed evidence of drug induced hepatotoxicity. He was started on IV glucocorticoids and he  was discharged home on prednisone 80 mg daily. After discharge patient underwent therapeutic VILLAVICENCIO ablation (5/28/20); with 21.4 millicuries.      07/2020: Post VILLAVICENCIO ablative therapy TFTs showed TSH of 90.11 mIU/L and free T4 of 0.39 ng/dL and patient was started on levothyroxine 112 mcg daily on 7/8/20.      08/2020: Repeat TFTs: TSH: 16, FT4:1.16, LT4 increased to 150mcg PO daily.     10/2020: F/u in clinic, continued on LT4 150mcg PO daily. TFT: TSH:0.41, FT4:1.2      The patient reports feeling well overall and has further improvement in his eye sx; minimal watering.   Denies itching or redness of the eyes.   Denies any changes in vision, visual deficits or double vision.   Using eye drops (gell at night and PRN during the day) , sleeping propped up, avoiding salt and smoke, using sunglasses outdoors.  Patient reports having good energy, denies palpitations excessive sweating tremors or muscle cramps.  No diarrhea ,weight is stable.          EXAM    Constitutional: well-developed, well-nourished,  , in no acute distress   Skin: Skin is appropriately warm. Skin color is normal with no suspicious lesions or rashes   Eyes: no lid retraction, no chemosis, right globe more soft to palpation, left globe soft to palpation, no tenderness of globes, EOMI, visual field testing intact, Exophthalmometer measurements : right 22mm, left 22mm ; base: 111 , stable  Neck Thyroid: Not palpated   Cardiovascular: Regular rate and rhythm. Normal S1 and S2, no gallops, no murmurs   Musculoskeletal: No proximal muscle weakness   Neurologic : Moving all extremities spontaneously. No balance or gait disturbances.  No  tremors of outstretched hands  Deep tendon reflexes are normal and without delay in the return phase; positive Chvostek's sign       Impression/ Plan    Doing well; clinically and biochemically euthyroid. Will keep same meds; return in 6 months.   Giuliano Rosario MD

## 2024-10-31 LAB — TSI SER-ACNC: 1.9 TSI INDEX

## 2024-11-04 ENCOUNTER — DOCUMENTATION (OUTPATIENT)
Dept: ENDOCRINOLOGY | Facility: HOSPITAL | Age: 29
End: 2024-11-04
Payer: COMMERCIAL

## 2024-11-04 DIAGNOSIS — E05.00 GRAVES DISEASE: Primary | ICD-10-CM

## 2024-11-04 DIAGNOSIS — Z87.39 HISTORY OF OSTEOPENIA: ICD-10-CM

## 2024-11-04 NOTE — PROGRESS NOTES
" Latest Reference Range & Units 10/11/24 11:38   Thyroxine, Free 0.61 - 1.12 ng/dL 1.33 (H)   Thyroid Stimulating Hormone 0.44 - 3.98 mIU/L 0.95   TSI <=1.3 TSI index 1.9 (H)   (H): Data is abnormally high      Called Mr. Hernández for updates about the above lab work, no answer, left a VM and sent a [employer] message with the following:  \"Dear Mr. Hernández,  We have reviewed your lab results regarding your thyroid hormone, it looks like you should continue the current dose of Levothyroxine.  The antibody test you had shows that the Graves eye disease is still active but less active compared to what it was few months ago.     We will order blood work and bone density scan prior to next appointment (April 2025), as you had low bone density 2 years ago = osteopenia.     Take care.\"      " Rapid Response Nurse Chart Check     Chart check completed, abnormal VS noted (temp 100.4, , RR 22, SpO2 93). Bedside RNSarika contacted. Repeat CBC ordered.  Instructed to call [phone] for further concerns or assistance.

## 2024-12-02 ENCOUNTER — APPOINTMENT (OUTPATIENT)
Dept: CARDIOLOGY | Facility: HOSPITAL | Age: 29
End: 2024-12-02
Payer: COMMERCIAL

## 2024-12-02 ENCOUNTER — HOSPITAL ENCOUNTER (EMERGENCY)
Facility: HOSPITAL | Age: 29
Discharge: HOME | End: 2024-12-02
Attending: STUDENT IN AN ORGANIZED HEALTH CARE EDUCATION/TRAINING PROGRAM
Payer: COMMERCIAL

## 2024-12-02 ENCOUNTER — APPOINTMENT (OUTPATIENT)
Dept: RADIOLOGY | Facility: HOSPITAL | Age: 29
End: 2024-12-02
Payer: COMMERCIAL

## 2024-12-02 VITALS
BODY MASS INDEX: 27.4 KG/M2 | TEMPERATURE: 98.4 F | SYSTOLIC BLOOD PRESSURE: 116 MMHG | WEIGHT: 185 LBS | HEART RATE: 81 BPM | RESPIRATION RATE: 18 BRPM | DIASTOLIC BLOOD PRESSURE: 69 MMHG | HEIGHT: 69 IN | OXYGEN SATURATION: 97 %

## 2024-12-02 DIAGNOSIS — R55 VASOVAGAL EPISODE: Primary | ICD-10-CM

## 2024-12-02 DIAGNOSIS — R51.9 FACIAL PAIN: ICD-10-CM

## 2024-12-02 LAB — GLUCOSE BLD MANUAL STRIP-MCNC: 152 MG/DL (ref 74–99)

## 2024-12-02 PROCEDURE — 99284 EMERGENCY DEPT VISIT MOD MDM: CPT | Mod: 25 | Performed by: STUDENT IN AN ORGANIZED HEALTH CARE EDUCATION/TRAINING PROGRAM

## 2024-12-02 PROCEDURE — 70450 CT HEAD/BRAIN W/O DYE: CPT

## 2024-12-02 PROCEDURE — 82947 ASSAY GLUCOSE BLOOD QUANT: CPT

## 2024-12-02 PROCEDURE — 76377 3D RENDER W/INTRP POSTPROCES: CPT | Performed by: RADIOLOGY

## 2024-12-02 PROCEDURE — 70450 CT HEAD/BRAIN W/O DYE: CPT | Performed by: RADIOLOGY

## 2024-12-02 PROCEDURE — 70486 CT MAXILLOFACIAL W/O DYE: CPT

## 2024-12-02 PROCEDURE — 93005 ELECTROCARDIOGRAM TRACING: CPT

## 2024-12-02 PROCEDURE — 70486 CT MAXILLOFACIAL W/O DYE: CPT | Performed by: RADIOLOGY

## 2024-12-02 PROCEDURE — 76377 3D RENDER W/INTRP POSTPROCES: CPT

## 2024-12-02 RX ORDER — ACETAMINOPHEN 325 MG/1
975 TABLET ORAL ONCE
Status: DISCONTINUED | OUTPATIENT
Start: 2024-12-02 | End: 2024-12-02 | Stop reason: HOSPADM

## 2024-12-02 ASSESSMENT — LIFESTYLE VARIABLES
HAVE YOU EVER FELT YOU SHOULD CUT DOWN ON YOUR DRINKING: NO
TOTAL SCORE: 0
EVER HAD A DRINK FIRST THING IN THE MORNING TO STEADY YOUR NERVES TO GET RID OF A HANGOVER: NO
HAVE PEOPLE ANNOYED YOU BY CRITICIZING YOUR DRINKING: NO
EVER FELT BAD OR GUILTY ABOUT YOUR DRINKING: NO

## 2024-12-02 NOTE — ED TRIAGE NOTES
Pt BIB EMS. Pt states he had a syncopal episode while going to the bathroom. Pt states he hit his nose and had some nose bleeding. Only complaint is nose pain.

## 2024-12-02 NOTE — ED PROVIDER NOTES
HPI   Chief Complaint   Patient presents with    Facial Injury         History provided by:  Patient   used: No      29-year-old male presents ED via EMS for evaluation of nasal pain onset prior to arrival.  Patient states that he got up, was bearing down in the bathroom and then passed out.  He hit his nose on the ground.  He was ambulatory after the event.  He states he feels back to baseline and has no additional symptoms or complaints at this time.  Denies any anticoagulation use.  Denies any smoking, drugs or alcohol.  Denies any cardiac history or diabetes.    ROS is otherwise negative unless stated above.      Patient History   Past Medical History:   Diagnosis Date    Autoimmune hepatitis (Multi)     Autoimmune hepatitis treated with steroids    Encounter for screening for osteoporosis     Osteoporosis screening     Past Surgical History:   Procedure Laterality Date    IR VENOGRAM HEPATIC  6/20/2016    [place] [facility] [place]    OTHER SURGICAL HISTORY  04/06/2021    Shoulder surgery    US GUIDED NEEDLE LIVER BIOPSY  5/19/2020    US GUIDED NEEDLE LIVER BIOPSY [place] [facility] [place]     No family history on file.  Social History     Tobacco Use    Smoking status: Never     Passive exposure: Never    Smokeless tobacco: Never   Substance Use Topics    Alcohol use: Not Currently    Drug use: Never       Physical Exam   ED Triage Vitals [12/02/24 0214]   Temperature Heart Rate Respirations BP   36.9 °C (98.4 °F) 75 18 116/66      Pulse Ox Temp src Heart Rate Source Patient Position   99 % -- -- --      BP Location FiO2 (%)     -- --       Physical Exam  VS: As documented in the triage note and EMR flowsheet from this visit were reviewed.    GEN: NAD, nontoxic, well-appearing, ambulates without difficulty  EYES: PERRLA  HEENT: Airway patent, no hemotympanums noted.  No active epistaxis noted  CARD: RRR, nontender chest, no crepitus deformities, no JVD, no murmurs  rubs or gallops ; No edema noted.  Positive pulses bilaterally throughout.  Capillary refill less than 3 seconds.  No abnormal redness, warmth, tenderness or swelling noted to bilateral lower extremities.  PULMONARY: Clear all lung fields. Moving air well, Nonlabored, no accessory muscle use, able to speak complete sentences  ABDOMEN: Abdomen soft, non-distended, no rebound, no guarding. Bowel sounds normal in all 4 quadrants. No tenderness to palpation.  No CVA tenderness.  No masses or organomegaly noted.  No evidence of peritonitis.   : deferred  MUSK: Spine appears normal, range of motion is not limited, positive nasal tenderness to palpation with scant dried bloody drainage.  Strength 5 out of 5 equal bilaterally throughout.  No step-offs, deformities or additional signs of trauma noted.  No spinal/midline tenderness to palpation  SKIN: Skin normal color for race, warm, dry and intact. No evidence of trauma. No rash noted.  NEURO: Alert and oriented x 3, speech is clear, no obvious deficits noted. No facial droop noted.    PSYCH: Alert and oriented to person, place, time/situation. normal mood and affect. No apparent risk to self or others. Thoughts are linear.  Does not appear decompensated.  Does not appear internally stimulated.  LYMPH: No adenopathy or splenomegaly. No cervical, supraclavicular or inguinal lymphadenopathy.    ED Course & MDM   Diagnoses as of 12/02/24 0225   Vasovagal episode   Facial pain                 No data recorded     [place] Coma Scale Score: 15 (12/02/24 0215 : Austin Talavera, EMT)                           Medical Decision Making      This is a 29-year-old male who presents to the ED for evaluation of vasovagal episode while on the toilet prior to arrival.  Patient is back to baseline, no focal neurodeficits are noted.  He is placed on continuous cardiac monitor and pulse oximetry.  He does have nasal tenderness to palpation with scant bloody drainage surrounding but there is  no active epistaxis or additional evidence of acute traumatic injury.  He is ambulatory without difficulty and tolerating p.o.  He is given medications and an ice pack for his symptoms with improvement.  Workup was reviewed.  He remained hemodynamically stable throughout ED course of care.  Is educated to not bear down and to maintain proper rest and hydration.  Educated signs symptoms of concussion to maintain proper hydration.  Findings and plan were discussed with patient is agreeable to plan and acknowledged understanding.    EKG Interpretation: Rhythm at a rate of 74, , , QTc 412 without evidence of STEMI or ischemia.  Normal early repol pattern.    Differential Diagnoses Considered: Syncope, vasovagal episode, arrhythmia, hypoglycemia, dehydration, concussion    Chronic Medical Conditions Significantly Affecting Care: None    External Records Reviewed: I reviewed recent and relevant outside records including: PCP notes, prior discharge summary, previous radiologic studies, HIE    Independent Interpretation of Studies:  I independently interpreted: CT head which revealed no acute intracranial process or injury    Escalation of Care:  Appropriate for outpatient management primary care provider follow-up in the next week as needed for reevaluation.  Additionally provided a referral for ENT.  Return precautions discussed and patient verbalized understanding. All questions and concerns were addressed.    Social Determinants of Health Significantly Affecting Care:  None    Prescription Drug Consideration: OTC Tylenol and ibuprofen p.o. at home as needed for pain.  Educated to apply ice to the nose for additional management    Diagnostic testing considered: Considered additional imaging laboratory studies but as patient is a otherwise healthy 29-year-old male with a story consistent with vasovagal episode while the toilet I do not believe excessive workup for radiation is indicated this  time    Discussion of Management with Other Providers:   I discussed the patient/results with: None    *Please note that portions of this note may have been completed with a voice recognition program.  Efforts were made to edit the dictations but occasionally, words are mis-transcribed.    Procedure  Procedures     Phyllis Feng, DANNI-CNP  12/02/24 5048

## 2024-12-02 NOTE — Clinical Note
Rubén Hernández was seen and treated in our emergency department on 12/2/2024.  He may return to work on 12/03/2024.       If you have any questions or concerns, please don't hesitate to call.      Phyllis Feng, APRN-CNP

## 2024-12-03 LAB
ATRIAL RATE: 75 BPM
P AXIS: -2 DEGREES
PR INTERVAL: 156 MS
Q ONSET: 249 MS
QRS COUNT: 12 BEATS
QRS DURATION: 108 MS
QT INTERVAL: 371 MS
QTC CALCULATION(BAZETT): 412 MS
QTC FREDERICIA: 398 MS
R AXIS: -53 DEGREES
T AXIS: 25 DEGREES
T OFFSET: 435 MS
VENTRICULAR RATE: 74 BPM

## 2024-12-09 ENCOUNTER — APPOINTMENT (OUTPATIENT)
Dept: GASTROENTEROLOGY | Facility: CLINIC | Age: 29
End: 2024-12-09
Payer: COMMERCIAL

## 2024-12-11 ENCOUNTER — OFFICE VISIT (OUTPATIENT)
Dept: PRIMARY CARE | Facility: CLINIC | Age: 29
End: 2024-12-11
Payer: COMMERCIAL

## 2024-12-11 ENCOUNTER — LAB (OUTPATIENT)
Dept: LAB | Facility: LAB | Age: 29
End: 2024-12-11
Payer: COMMERCIAL

## 2024-12-11 VITALS
SYSTOLIC BLOOD PRESSURE: 128 MMHG | HEIGHT: 69 IN | OXYGEN SATURATION: 98 % | HEART RATE: 68 BPM | WEIGHT: 184 LBS | DIASTOLIC BLOOD PRESSURE: 86 MMHG | BODY MASS INDEX: 27.25 KG/M2

## 2024-12-11 DIAGNOSIS — R55 SYNCOPE, UNSPECIFIED SYNCOPE TYPE: ICD-10-CM

## 2024-12-11 DIAGNOSIS — K51.90 ULCERATIVE COLITIS WITHOUT COMPLICATIONS, UNSPECIFIED LOCATION (MULTI): ICD-10-CM

## 2024-12-11 DIAGNOSIS — Z00.00 ANNUAL PHYSICAL EXAM: ICD-10-CM

## 2024-12-11 DIAGNOSIS — Z76.89 ENCOUNTER TO ESTABLISH CARE: ICD-10-CM

## 2024-12-11 DIAGNOSIS — Z00.00 ANNUAL PHYSICAL EXAM: Primary | ICD-10-CM

## 2024-12-11 DIAGNOSIS — K75.4 AUTOIMMUNE HEPATITIS (MULTI): ICD-10-CM

## 2024-12-11 DIAGNOSIS — Z23 ENCOUNTER FOR IMMUNIZATION: ICD-10-CM

## 2024-12-11 LAB
25(OH)D3 SERPL-MCNC: 104 NG/ML (ref 30–100)
ALBUMIN SERPL BCP-MCNC: 4.5 G/DL (ref 3.4–5)
ALP SERPL-CCNC: 61 U/L (ref 33–120)
ALT SERPL W P-5'-P-CCNC: 27 U/L (ref 10–52)
ANION GAP SERPL CALC-SCNC: 11 MMOL/L (ref 10–20)
AST SERPL W P-5'-P-CCNC: 21 U/L (ref 9–39)
BASOPHILS # BLD AUTO: 0.05 X10*3/UL (ref 0–0.1)
BASOPHILS NFR BLD AUTO: 0.9 %
BILIRUB SERPL-MCNC: 0.4 MG/DL (ref 0–1.2)
BUN SERPL-MCNC: 16 MG/DL (ref 6–23)
CALCIUM SERPL-MCNC: 9.7 MG/DL (ref 8.6–10.6)
CHLORIDE SERPL-SCNC: 105 MMOL/L (ref 98–107)
CHOLEST SERPL-MCNC: 178 MG/DL (ref 0–199)
CHOLESTEROL/HDL RATIO: 3
CO2 SERPL-SCNC: 30 MMOL/L (ref 21–32)
CREAT SERPL-MCNC: 0.98 MG/DL (ref 0.5–1.3)
EGFRCR SERPLBLD CKD-EPI 2021: >90 ML/MIN/1.73M*2
EOSINOPHIL # BLD AUTO: 0.16 X10*3/UL (ref 0–0.7)
EOSINOPHIL NFR BLD AUTO: 2.8 %
ERYTHROCYTE [DISTWIDTH] IN BLOOD BY AUTOMATED COUNT: 12.6 % (ref 11.5–14.5)
GLUCOSE SERPL-MCNC: 100 MG/DL (ref 74–99)
HCT VFR BLD AUTO: 48 % (ref 41–52)
HDLC SERPL-MCNC: 59.4 MG/DL
HGB BLD-MCNC: 16.4 G/DL (ref 13.5–17.5)
IMM GRANULOCYTES # BLD AUTO: 0.08 X10*3/UL (ref 0–0.7)
IMM GRANULOCYTES NFR BLD AUTO: 1.4 % (ref 0–0.9)
LDLC SERPL CALC-MCNC: 113 MG/DL
LYMPHOCYTES # BLD AUTO: 1.19 X10*3/UL (ref 1.2–4.8)
LYMPHOCYTES NFR BLD AUTO: 20.9 %
MCH RBC QN AUTO: 34.2 PG (ref 26–34)
MCHC RBC AUTO-ENTMCNC: 34.2 G/DL (ref 32–36)
MCV RBC AUTO: 100 FL (ref 80–100)
MONOCYTES # BLD AUTO: 0.88 X10*3/UL (ref 0.1–1)
MONOCYTES NFR BLD AUTO: 15.5 %
NEUTROPHILS # BLD AUTO: 3.33 X10*3/UL (ref 1.2–7.7)
NEUTROPHILS NFR BLD AUTO: 58.5 %
NON HDL CHOLESTEROL: 119 MG/DL (ref 0–149)
NRBC BLD-RTO: 0 /100 WBCS (ref 0–0)
PLATELET # BLD AUTO: 232 X10*3/UL (ref 150–450)
POTASSIUM SERPL-SCNC: 4.5 MMOL/L (ref 3.5–5.3)
PROT SERPL-MCNC: 7.2 G/DL (ref 6.4–8.2)
RBC # BLD AUTO: 4.8 X10*6/UL (ref 4.5–5.9)
SODIUM SERPL-SCNC: 141 MMOL/L (ref 136–145)
TRIGL SERPL-MCNC: 30 MG/DL (ref 0–149)
VIT B12 SERPL-MCNC: 644 PG/ML (ref 211–911)
VLDL: 6 MG/DL (ref 0–40)
WBC # BLD AUTO: 5.7 X10*3/UL (ref 4.4–11.3)

## 2024-12-11 PROCEDURE — 99214 OFFICE O/P EST MOD 30 MIN: CPT | Performed by: STUDENT IN AN ORGANIZED HEALTH CARE EDUCATION/TRAINING PROGRAM

## 2024-12-11 PROCEDURE — 1036F TOBACCO NON-USER: CPT | Performed by: STUDENT IN AN ORGANIZED HEALTH CARE EDUCATION/TRAINING PROGRAM

## 2024-12-11 PROCEDURE — 90715 TDAP VACCINE 7 YRS/> IM: CPT | Performed by: STUDENT IN AN ORGANIZED HEALTH CARE EDUCATION/TRAINING PROGRAM

## 2024-12-11 PROCEDURE — 82306 VITAMIN D 25 HYDROXY: CPT

## 2024-12-11 PROCEDURE — 90677 PCV20 VACCINE IM: CPT | Performed by: STUDENT IN AN ORGANIZED HEALTH CARE EDUCATION/TRAINING PROGRAM

## 2024-12-11 PROCEDURE — 90472 IMMUNIZATION ADMIN EACH ADD: CPT | Performed by: STUDENT IN AN ORGANIZED HEALTH CARE EDUCATION/TRAINING PROGRAM

## 2024-12-11 PROCEDURE — 82607 VITAMIN B-12: CPT

## 2024-12-11 PROCEDURE — 80061 LIPID PANEL: CPT

## 2024-12-11 PROCEDURE — 3008F BODY MASS INDEX DOCD: CPT | Performed by: STUDENT IN AN ORGANIZED HEALTH CARE EDUCATION/TRAINING PROGRAM

## 2024-12-11 PROCEDURE — 36415 COLL VENOUS BLD VENIPUNCTURE: CPT

## 2024-12-11 PROCEDURE — 99395 PREV VISIT EST AGE 18-39: CPT | Performed by: STUDENT IN AN ORGANIZED HEALTH CARE EDUCATION/TRAINING PROGRAM

## 2024-12-11 PROCEDURE — 90471 IMMUNIZATION ADMIN: CPT | Performed by: STUDENT IN AN ORGANIZED HEALTH CARE EDUCATION/TRAINING PROGRAM

## 2024-12-11 PROCEDURE — 85025 COMPLETE CBC W/AUTO DIFF WBC: CPT

## 2024-12-11 PROCEDURE — 80053 COMPREHEN METABOLIC PANEL: CPT

## 2024-12-11 ASSESSMENT — PATIENT HEALTH QUESTIONNAIRE - PHQ9
1. LITTLE INTEREST OR PLEASURE IN DOING THINGS: NOT AT ALL
2. FEELING DOWN, DEPRESSED OR HOPELESS: NOT AT ALL
SUM OF ALL RESPONSES TO PHQ9 QUESTIONS 1 & 2: 0

## 2024-12-11 NOTE — PROGRESS NOTES
Subjective   Patient ID: Rubén Hernández is a 29 y.o. male who presents for the following    Assessment/Plan   Preventative Medicine  -UTD on vaccines. PNA and tetanus to be given today     Recent Syncope  -Vasovagal likely  -Seen at [facility] on 12/2 and CT head was negative  -EKG done at that time was read as sinus rhythm, left axis deviation, probable normal early repolarization pattern.  - Although vasovagal syncope seems like the likely diagnosis will rule out cardiogenic syncope with Holter monitor and echocardiogram.  No carotid bruits heard on exam and no significant history of ASCVD or cardiovascular disease.    Ulcerative Colitis   -Follows with Dr Rodriguez  -Last colonoscopy in June 2024  -Currently on rinvoq  -DEXA scan pending  -Last Vitamin D level > 100. Will check today.     AIH   -Confirmed by liver bx   -Sees Dr Owen  -Currently on mercaptopurine and daily steroid   -Well controlled    Graves Disease s/p VILLAVICENCIO ablation  -Currently on Synthroid  -Last T4 1.33 in Oct 2024  -Follows with Endocrine        HPI  29-year-old male presents to [facility] care, annual physical, acute visit.    He was recently at [facility] on 12/2/2024 after having syncopal episode while bearing down to have bowel movement.  Had confirmed loss of consciousness and fell forward onto his face and thorax.  Had left-sided chest wall tenderness, nasal pain.  CT of the head was done which showed no acute facial bone fractures or acute intracranial abnormality.    Has not had any recurrent dizziness, syncope, lightheadedness, vision changes.  Denies any chest pain, palpitations, shortness of breath, dyspnea on exertion.    Patient has a history of ulcerative colitis, autoimmune hepatitis.  These are both well-controlled on mercaptopurine and Rinvoq.  He follows with the gastroenterologist both diseases.  Denies any recent black or bloody stools, abdominal pain, nausea vomiting diarrhea.  Had colonoscopy recently in June 2024.    Otherwise  patient also follows with endocrinologist due to history of Graves' disease and is status post radioactive iodine ablation.    Denies fevers, chills, weight loss, lightheadedness, dizziness, vision changes, sore throat, runny nose, CP, SOB, cough, palpitations, n/v/d, abd pain, black/bloody stools, arthralgias, mood disturbance, or new numbness/weakness/tingling in arms/legs/face.        Past Medical History:   Diagnosis Date    Autoimmune hepatitis (Multi)     Autoimmune hepatitis treated with steroids    Encounter for screening for osteoporosis     Osteoporosis screening       Past Surgical History:   Procedure Laterality Date    IR VENOGRAM HEPATIC  6/20/2016    IR VENOGRAM HEPATIC 6/20/2016 [facility] [place]PATIENT LEGACY    OTHER SURGICAL HISTORY  04/06/2021    Shoulder surgery    US GUIDED NEEDLE LIVER BIOPSY  5/19/2020    US GUIDED NEEDLE LIVER BIOPSY 5/19/2020 [facility] [place] AIB LEGACY   VILLAVICENCIO ablation for graves disease     Family Hx  -Maternal: none  -Paternal: DM  -Cancer: none reported    Social Hx   -T: denies  -A: denies  -D: denies     Personal Hx  -  -Teacher at [employer]       JUNTA.CL Drivers of Health     Tobacco Use: Low Risk  (12/11/2024)    Patient History     Smoking Tobacco Use: Never     Smokeless Tobacco Use: Never     Passive Exposure: Never   Alcohol Use: Not At Risk (10/24/2023)    AUDIT-C     Frequency of Alcohol Consumption: Never     Average Number of Drinks: Patient does not drink     Frequency of Binge Drinking: Never   Financial Resource Strain: Not on file   Food Insecurity: No Food Insecurity (10/24/2023)    Hunger Vital Sign     Worried About Running Out of Food in the Last Year: Never true     Ran Out of Food in the Last Year: Never true   Transportation Needs: Not on file   Physical Activity: Not on file   Stress: Not on file   Social Connections: Not on file   Intimate Partner Violence: Not on file   Depression: Not at risk (12/11/2024)    PHQ-2     PHQ-2 Score: 0   Housing Stability:  "Not on file   Utilities: Not on file   Digital Equity: Not on file   Health Literacy: Not on file         Visit Vitals  /86   Pulse 68   Ht 1.753 m (5' 9\")   Wt 83.5 kg (184 lb)   SpO2 98%   BMI 27.17 kg/m²   Smoking Status Never   BSA 2.02 m²     PHYSICAL EXAM   Physical Exam       General: NAD. NCAT. Aox3   HEENT: PERRLA. EOMI. MMM. Nares patent bl.  Cardiovascular: RRR. No MRG. S1/S2 wnl.   Respiratory: CTABL. No acute respiratory distress.   GI: Soft, NT abdomen. BS present x 4.   MSK: ROM x 4. CTLS non-tender.   Extremities: No edema. Cap refill < 2 sec.   Skin: No rashes or bruises.   Neuro: Aox3. Cranial Nerves grossly intact. Motor/sensory wnl.   Psych: Mood wnl.      REVIEW OF SYSTEMS   ROS IN HPI     No Known Allergies    Current Outpatient Medications   Medication Sig Dispense Refill    ascorbic acid (Vitamin C) 125 mg chewable tablet Chew 2 tablets (250 mg) once daily.      cholecalciferol (Vitamin D-3) 125 MCG (5000 UT) capsule Take 1 capsule (125 mcg) by mouth once daily. 90 capsule 3    esomeprazole (NexIUM) 20 mg DR capsule Take 1 capsule (20 mg) by mouth once daily.      levothyroxine (Synthroid, Levoxyl) 150 mcg tablet Take one tablet in the am on an empty stomach 6 days a week, half tablet on 7 th day. 85 tablet 3    mercaptopurine (Purinethol) 50 mg tablet Take 1.5 tablets (75 mg total) by mouth once every day. 45 tablet 11    predniSONE (Deltasone) 1 mg tablet TAKE 4 TABLETS BY MOUTH EVERY  tablet 11    Rinvoq 30 mg tablet extended release 24 hr tablet TAKE 1 TABLET DAILY 90 tablet 1     No current facility-administered medications for this visit.       Objective     Admission on 12/02/2024, Discharged on 12/02/2024   Component Date Value Ref Range Status    Ventricular Rate 12/02/2024 74  BPM Preliminary    Atrial Rate 12/02/2024 75  BPM Preliminary    MO Interval 12/02/2024 156  ms Preliminary    QRS Duration 12/02/2024 108  ms Preliminary    QT Interval 12/02/2024 371  ms " Preliminary    QTC Calculation(Bazett) 12/02/2024 412  ms Preliminary    P Axis 12/02/2024 -2  degrees Preliminary    R Axis 12/02/2024 -53  degrees Preliminary    T Axis 12/02/2024 25  degrees Preliminary    QRS Count 12/02/2024 12  beats Preliminary    Q Onset 12/02/2024 249  ms Preliminary    T Offset 12/02/2024 435  ms Preliminary    QTC Fredericia 12/02/2024 398  ms Preliminary    POCT Glucose 12/02/2024 152 (H)  74 - 99 mg/dL Final   Lab on 10/11/2024   Component Date Value Ref Range Status    Thyroid Stimulating Hormone 10/11/2024 0.95  0.44 - 3.98 mIU/L Final    Thyroxine, Free 10/11/2024 1.33 (H)  0.61 - 1.12 ng/dL Final    TSI 10/11/2024 1.9 (H)  <=1.3 TSI index Final       Radiology: Reviewed imaging in powerchart.  ECG 12 lead    Result Date: 12/3/2024  Sinus rhythm Left axis deviation ST elev, probable normal early repol pattern    CT head wo IV contrast    Result Date: 12/2/2024  Interpreted By:  Veena Posadas, STUDY: CT HEAD WO IV CONTRAST; CT FACIAL BONES WO IV CONTRAST; CT 3D RECONSTRUCTION;  12/2/2024 3:22 am   INDICATION: Signs/Symptoms:syncope; Signs/Symptoms:trauma.   COMPARISON: None.   ACCESSION NUMBER(S): [id number]; [id number]; [id number]   ORDERING CLINICIAN: CHIP NIXON   TECHNIQUE: Axial noncontrast CT images of the head. Axial noncontrast CT images of the facial bones. 3D reformats of the facial bones were performed on independent workstation.   FINDINGS: BRAIN PARENCHYMA: Gray-white matter interfaces are preserved. No mass effect or midline shift.   HEMORRHAGE: No acute intracranial hemorrhage. VENTRICLES and EXTRA-AXIAL SPACES: Normal size. EXTRACRANIAL SOFT TISSUES: Within normal limits. PARANASAL SINUSES/MASTOIDS: The visualized paranasal sinuses and mastoid air cells are aerated. CALVARIUM: No depressed skull fracture. No destructive osseous lesion.   OTHER FINDINGS: None.   FACIAL BONES: No acute facial bone fracture. SOFT TISSUES: Within normal limits.  PARANASAL SINUSES: No hemorrhage in the paranasal sinuses. MASTOIDS: Within normal limits. ORBITS: The globes, extraocular muscles and optic nerve sheath complexes are symmetric. No retrobulbar hematoma.   OTHER FINDINGS: None.       No acute intracranial abnormality.   No acute facial bone fracture.     MACRO: None   Signed by: Veena Posadas 12/2/2024 3:37 AM Dictation workstation:   [id number]    CT maxillofacial bones wo IV contrast    Result Date: 12/2/2024  Interpreted By:  Veena Posadas, STUDY: CT HEAD WO IV CONTRAST; CT FACIAL BONES WO IV CONTRAST; CT 3D RECONSTRUCTION;  12/2/2024 3:22 am   INDICATION: Signs/Symptoms:syncope; Signs/Symptoms:trauma.   COMPARISON: None.   ACCESSION NUMBER(S): [id number]; [id number]; [id number]   ORDERING CLINICIAN: CHIP NIXON   TECHNIQUE: Axial noncontrast CT images of the head. Axial noncontrast CT images of the facial bones. 3D reformats of the facial bones were performed on independent workstation.   FINDINGS: BRAIN PARENCHYMA: Gray-white matter interfaces are preserved. No mass effect or midline shift.   HEMORRHAGE: No acute intracranial hemorrhage. VENTRICLES and EXTRA-AXIAL SPACES: Normal size. EXTRACRANIAL SOFT TISSUES: Within normal limits. PARANASAL SINUSES/MASTOIDS: The visualized paranasal sinuses and mastoid air cells are aerated. CALVARIUM: No depressed skull fracture. No destructive osseous lesion.   OTHER FINDINGS: None.   FACIAL BONES: No acute facial bone fracture. SOFT TISSUES: Within normal limits. PARANASAL SINUSES: No hemorrhage in the paranasal sinuses. MASTOIDS: Within normal limits. ORBITS: The globes, extraocular muscles and optic nerve sheath complexes are symmetric. No retrobulbar hematoma.   OTHER FINDINGS: None.       No acute intracranial abnormality.   No acute facial bone fracture.     MACRO: None   Signed by: Veena Posadas 12/2/2024 3:37 AM Dictation workstation:   [id number]    CT 3D reconstruction    Result  Date: 12/2/2024  Interpreted By:  Veena Posadas, STUDY: CT HEAD WO IV CONTRAST; CT FACIAL BONES WO IV CONTRAST; CT 3D RECONSTRUCTION;  12/2/2024 3:22 am   INDICATION: Signs/Symptoms:syncope; Signs/Symptoms:trauma.   COMPARISON: None.   ACCESSION NUMBER(S): [id number]; [id number]; [id number]   ORDERING CLINICIAN: CHIP NIXON   TECHNIQUE: Axial noncontrast CT images of the head. Axial noncontrast CT images of the facial bones. 3D reformats of the facial bones were performed on independent workstation.   FINDINGS: BRAIN PARENCHYMA: Gray-white matter interfaces are preserved. No mass effect or midline shift.   HEMORRHAGE: No acute intracranial hemorrhage. VENTRICLES and EXTRA-AXIAL SPACES: Normal size. EXTRACRANIAL SOFT TISSUES: Within normal limits. PARANASAL SINUSES/MASTOIDS: The visualized paranasal sinuses and mastoid air cells are aerated. CALVARIUM: No depressed skull fracture. No destructive osseous lesion.   OTHER FINDINGS: None.   FACIAL BONES: No acute facial bone fracture. SOFT TISSUES: Within normal limits. PARANASAL SINUSES: No hemorrhage in the paranasal sinuses. MASTOIDS: Within normal limits. ORBITS: The globes, extraocular muscles and optic nerve sheath complexes are symmetric. No retrobulbar hematoma.   OTHER FINDINGS: None.       No acute intracranial abnormality.   No acute facial bone fracture.     MACRO: None   Signed by: Veena Posadas 12/2/2024 3:37 AM Dictation workstation:   [id number]      No family history on file.  Social History     Socioeconomic History    Marital status:    Tobacco Use    Smoking status: Never     Passive exposure: Never    Smokeless tobacco: Never   Substance and Sexual Activity    Alcohol use: Not Currently    Drug use: Never     Social Drivers of Health     Food Insecurity: No Food Insecurity (10/24/2023)    Hunger Vital Sign     Worried About Running Out of Food in the Last Year: Never true     Ran Out of Food in the Last Year: Never  true     Past Medical History:   Diagnosis Date    Autoimmune hepatitis (Multi)     Autoimmune hepatitis treated with steroids    Encounter for screening for osteoporosis     Osteoporosis screening     Past Surgical History:   Procedure Laterality Date    IR VENOGRAM HEPATIC  6/20/2016    [place] [facility] [place]    OTHER SURGICAL HISTORY  04/06/2021    Shoulder surgery    US GUIDED NEEDLE LIVER BIOPSY  5/19/2020    US GUIDED NEEDLE LIVER BIOPSY [place] [facility] [place]       Charting was completed using voice recognition technology and may include unintended errors.

## 2024-12-12 ENCOUNTER — TELEPHONE (OUTPATIENT)
Dept: PRIMARY CARE | Facility: CLINIC | Age: 29
End: 2024-12-12
Payer: COMMERCIAL

## 2024-12-12 NOTE — TELEPHONE ENCOUNTER
----- Message from Marine Lee sent at 12/12/2024 11:26 AM EST -----  Elevated lipids. Recommend low fat, low carb diet and 30 minutes of aerobic exercise at least 3 times weekly. Follow up in 6-12 months for repeat lipids.   Vitamin D 104. Can stop vitamin D supplementation and repeat in 3 months.

## 2024-12-16 ENCOUNTER — SPECIALTY PHARMACY (OUTPATIENT)
Dept: PHARMACY | Facility: CLINIC | Age: 29
End: 2024-12-16

## 2024-12-16 DIAGNOSIS — K51.018 ULCERATIVE PANCOLITIS WITH OTHER COMPLICATION: ICD-10-CM

## 2024-12-16 RX ORDER — UPADACITINIB 30 MG/1
30 TABLET, EXTENDED RELEASE ORAL DAILY
Qty: 30 TABLET | Refills: 11 | Status: SHIPPED | OUTPATIENT
Start: 2024-12-16

## 2024-12-23 ENCOUNTER — CLINICAL SUPPORT (OUTPATIENT)
Dept: GASTROENTEROLOGY | Facility: HOSPITAL | Age: 29
End: 2024-12-23
Payer: COMMERCIAL

## 2024-12-23 DIAGNOSIS — K75.4 AUTOIMMUNE HEPATITIS (MULTI): ICD-10-CM

## 2024-12-23 PROCEDURE — 91200 LIVER ELASTOGRAPHY: CPT | Performed by: INTERNAL MEDICINE

## 2024-12-31 ENCOUNTER — HOSPITAL ENCOUNTER (OUTPATIENT)
Dept: CARDIOLOGY | Facility: HOSPITAL | Age: 29
Discharge: HOME | End: 2024-12-31
Payer: COMMERCIAL

## 2024-12-31 DIAGNOSIS — R55 SYNCOPE, UNSPECIFIED SYNCOPE TYPE: ICD-10-CM

## 2024-12-31 LAB
AORTIC VALVE MEAN GRADIENT: 4 MMHG
AORTIC VALVE PEAK VELOCITY: 1.24 M/S
AV PEAK GRADIENT: 6 MMHG
AVA (PEAK VEL): 3.94 CM2
AVA (VTI): 3.96 CM2
EJECTION FRACTION APICAL 4 CHAMBER: 67.1
EJECTION FRACTION: 60 %
LEFT ATRIUM VOLUME AREA LENGTH INDEX BSA: 18.7 ML/M2
LEFT VENTRICLE INTERNAL DIMENSION DIASTOLE: 5.4 CM (ref 3.5–6)
LEFT VENTRICULAR OUTFLOW TRACT DIAMETER: 2.4 CM
MITRAL VALVE E/A RATIO: 1.57
RIGHT VENTRICLE FREE WALL PEAK S': 12.2 CM/S
TRICUSPID ANNULAR PLANE SYSTOLIC EXCURSION: 2.1 CM

## 2024-12-31 PROCEDURE — 93270 REMOTE 30 DAY ECG REV/REPORT: CPT

## 2024-12-31 PROCEDURE — 93306 TTE W/DOPPLER COMPLETE: CPT | Performed by: INTERNAL MEDICINE

## 2024-12-31 PROCEDURE — 93306 TTE W/DOPPLER COMPLETE: CPT

## 2025-01-02 ENCOUNTER — TELEPHONE (OUTPATIENT)
Dept: PRIMARY CARE | Facility: CLINIC | Age: 30
End: 2025-01-02
Payer: COMMERCIAL

## 2025-01-10 NOTE — PATIENT INSTRUCTIONS
If you have any questions or need assistance, please don't hesitate to contact us.     Our offices are located at [facility].  Please use the numbers below when calling.   Dr. Owen:         Office [phone] Fax: [phone]  Hepatology Nurse Coordinator:         Sandra MATHEWS [phone]     SCHEDULING  You will get a notification through Infor or a phone call to help you schedule all your tests. If you prefer, feel free to call the main scheduling number to set up any tests you need.    Main Scheduling Number: [phone]  (See below for department name when scheduling)    Here is a summary of the tests we have ordered:   [x] LABS (Can be done at any  Location)  Due : June     [x] FIBROSCAN (Department Gastro) Due : Dec  Type : FIBROSCAN: Type of liver elastography.  A special ultrasound that measures scarring (fibrosis) and fat (steatosis) in the liver.  The Fibroscan is located at 3 locations:  [facility], [facility]), [facility] PLEASE DO NOT EAT OR DRINK 3 HOURS BEFORE YOUR EXAM    [x] FOLLOW UP  (Department Gastro) Due : July

## 2025-01-13 ENCOUNTER — OFFICE VISIT (OUTPATIENT)
Dept: GASTROENTEROLOGY | Facility: CLINIC | Age: 30
End: 2025-01-13
Payer: COMMERCIAL

## 2025-01-13 VITALS
BODY MASS INDEX: 28.53 KG/M2 | WEIGHT: 192.6 LBS | TEMPERATURE: 98.6 F | HEART RATE: 81 BPM | SYSTOLIC BLOOD PRESSURE: 142 MMHG | HEIGHT: 69 IN | DIASTOLIC BLOOD PRESSURE: 87 MMHG

## 2025-01-13 DIAGNOSIS — K75.4 AUTOIMMUNE HEPATITIS (MULTI): ICD-10-CM

## 2025-01-13 PROCEDURE — 99213 OFFICE O/P EST LOW 20 MIN: CPT | Performed by: INTERNAL MEDICINE

## 2025-01-13 PROCEDURE — 3008F BODY MASS INDEX DOCD: CPT | Performed by: INTERNAL MEDICINE

## 2025-01-13 ASSESSMENT — PAIN SCALES - GENERAL: PAINLEVEL_OUTOF10: 0-NO PAIN

## 2025-01-14 ASSESSMENT — ENCOUNTER SYMPTOMS
VOMITING: 0
WHEEZING: 0
DIZZINESS: 0
ABDOMINAL DISTENTION: 0
COUGH: 0
ARTHRALGIAS: 0
CONFUSION: 0
CONSTIPATION: 0
HEADACHES: 0
COLOR CHANGE: 0
JOINT SWELLING: 0
DIARRHEA: 0
LIGHT-HEADEDNESS: 0
SLEEP DISTURBANCE: 0
DIFFICULTY URINATING: 0
ABDOMINAL PAIN: 0
SPEECH DIFFICULTY: 0
CHILLS: 0
FEVER: 0
UNEXPECTED WEIGHT CHANGE: 0
SHORTNESS OF BREATH: 0
NAUSEA: 0
TROUBLE SWALLOWING: 0

## 2025-01-14 NOTE — PROGRESS NOTES
Subjective  He feels well except for what appears to be a vasovagal reaction in December. ALT is normal. He is considering procreating in the future.       Review of Systems   Constitutional:  Negative for chills, fever and unexpected weight change.   HENT:  Negative for congestion and trouble swallowing.    Respiratory:  Negative for cough, shortness of breath and wheezing.    Cardiovascular:  Negative for chest pain.   Gastrointestinal:  Negative for abdominal distention, abdominal pain, constipation, diarrhea, nausea and vomiting.   Genitourinary:  Negative for difficulty urinating.   Musculoskeletal:  Negative for arthralgias and joint swelling.   Skin:  Negative for color change.   Neurological:  Negative for dizziness, speech difficulty, light-headedness and headaches.   Psychiatric/Behavioral:  Negative for confusion and sleep disturbance.        Physical Exam  Constitutional:       General: He is awake.      Appearance: Normal appearance.   HENT:      Head: Normocephalic and atraumatic.      Nose: Nose normal.      Mouth/Throat:      Mouth: Mucous membranes are moist.   Eyes:      Pupils: Pupils are equal, round, and reactive to light.   Neck:      Thyroid: No thyroid mass.      Trachea: Phonation normal.   Cardiovascular:      Rate and Rhythm: Normal rate and regular rhythm.      Heart sounds: Normal heart sounds. No murmur heard.     No gallop.   Pulmonary:      Effort: Pulmonary effort is normal. No respiratory distress.      Breath sounds: Normal air entry. No decreased breath sounds, wheezing, rhonchi or rales.   Abdominal:      General: Bowel sounds are normal. There is no distension.      Palpations: Abdomen is soft.      Tenderness: There is no abdominal tenderness.   Musculoskeletal:      Cervical back: Neck supple.      Right lower leg: No edema.      Left lower leg: No edema.   Skin:     General: Skin is warm.      Capillary Refill: Capillary refill takes less than 2 seconds.   Neurological:       "General: No focal deficit present.      Mental Status: He is alert and oriented to person, place, and time. Mental status is at baseline.      Cranial Nerves: Cranial nerves 2-12 are intact.      Motor: Motor function is intact.   Psychiatric:         Attention and Perception: Attention and perception normal.         Mood and Affect: Mood normal.         Speech: Speech normal.         Behavior: Behavior normal.     Computed MELD 3.0 unavailable. One or more values for this score either were not found within the given timeframe or did not fit some other criterion.  Computed MELD-Na unavailable. One or more values for this score either were not found within the given timeframe or did not fit some other criterion.     LABS:   Lab Results   Component Value Date    ALBUMIN 4.5 12/11/2024    BILITOT 0.4 12/11/2024    BILIDIR 0.1 05/03/2024    ALKPHOS 61 12/11/2024    ALT 27 12/11/2024    AST 21 12/11/2024    PROT 7.2 12/11/2024    HAVTO REACTIVE (A) 01/05/2021    HEPBSAB 110.7 01/05/2021    HEPBCAB NONREACTIVE 01/05/2021    HEPBSAG NONREACTIVE 01/05/2021    INR 1.0 06/10/2022    FERRITIN 15 (L) 01/03/2022    IRON 44 01/03/2022    IRONSAT 13 (L) 01/03/2022      No results found for: \"OAPS812\", \"LGUQ524\"   Lab Results   Component Value Date     12/11/2024    CREATININE 0.98 12/11/2024    BILITOT 0.4 12/11/2024    INR 1.0 06/10/2022     No results found for: \"AFP\"   Lab Results   Component Value Date    ALBUMIN 4.5 12/11/2024    BILITOT 0.4 12/11/2024    BILIDIR 0.1 05/03/2024    ALKPHOS 61 12/11/2024    ALT 27 12/11/2024    AST 21 12/11/2024    PROT 7.2 12/11/2024      Lab Results   Component Value Date    WBC 5.7 12/11/2024    HGB 16.4 12/11/2024    HCT 48.0 12/11/2024     12/11/2024     12/11/2024     Lab Results   Component Value Date    GLUCOSE 100 (H) 12/11/2024    CALCIUM 9.7 12/11/2024     12/11/2024    K 4.5 12/11/2024    CO2 30 12/11/2024     12/11/2024    BUN 16 12/11/2024    " CREATININE 0.98 12/11/2024     Lab Results   Component Value Date    INR 1.0 06/10/2022        === 01/15/24 ===    US ABDOMEN LIMITED LIVER    - Impression -  Contracted gallbladder with mild gallbladder wall thickening. No  obvious cholelithiasis.    2 small circumscribed echogenic lesions of the liver measuring up to  1 cm, nonspecific, however statistically likely representing  hemangiomas. CT or MRI hepatic mass protocol may be obtained for  further assessment.    MACRO:  None    Signed by: Compa Rm 1/15/2024 1:51 PM  Dictation workstation:   [id number]   Autoimmune hepatitis (Multi)  He will remain on same doses of 6MP and pred.  He will ask dr Rodriguez re biologics and 6MP in the context of interest in procreating in the next few months to years.  We will see him in 6 months

## 2025-01-14 NOTE — ASSESSMENT & PLAN NOTE
He will remain on same doses of 6MP and pred.  He will ask dr Rodriguez re biologics and 6MP in the context of interest in procreating in the next few months to years.  We will see him in 6 months

## 2025-01-20 NOTE — PROGRESS NOTES
"REASON FOR VISIT:  ulcerative colitis- and PSC    HPI:  Rubén Hernández is a 29 y.o. male who presents for follow-up.  Last seen 6/2024 for colonoscopy.  IBD colitis, felt to be ulcerative colitis (12/2020) and also autoimmune hepatitis, listed for a liver transplant since 2016. (+) thyroiditis-->Graves disease     5/2020 IBD SGI c/w IBD - Ulcerative Colitis (IFA Perinuclear Pattern Detected and DNAse Sensitive). 12/2020 colonoscopy w/ congestion, erosions, erythema, granularity, and friability throughout the colon. Path showed a chronic active pancolitis.     2/2021 started Entyvio-->only partial response; never achieved disease control on every 4 week Entyvio.   9/2021 changed to infliximab with initial response to induction, but then loss of benefit; dose increased to 10 mg/kg every 8 weeks. Also on 6MP 75 mg daily for liver disease. 7/2022 still symptomatic; Anser IFX 2.6 and FCP 1370 on 10 mg/kg.  Moved to Q6 wks, but never responded.     Changed to Rinvoq 1/2023 and improved.  Last seen in office 10/2023 and \"Everything is a lot better since starting Rinvoq#.  Weight up 9 lbs.  Stools 3 times daily; very close to his pre-disease baseline of 2-3 daily.  No blood in stools.  Stools formed.  No nocturnal stools.  No urgency.  Eating whatever he wants.  Continued on 6MP 75 mg and prednisone 3 mg daily.   Also on Nexium 20 mg daily; takes for JODY related to thyroxine.     6/2024 colonoscopy showed Scott 0 quiescent colitis.  Path with chronic active, focal active colitis, and chronic inactive colitis.  Indefinite for dysplasia in left colon.  Plan was to repeat colonoscopy in 1 year.      12/2024 Fibro scan F0-F1 fibrosis and S0 steatosis    In follow-up today, overall feeling well.  On Rinvoq 30 mg, 6MP 75 mg, and prednisone 3 mg daily has 2-3 stools daily.  Good control and good form.  No real issues.  No blood in stool.  Doesn't really think about disease.  No nocturnal stools.  No dietary limitations.    If " doesn't take esomeprazole 20 mg daily will have some heartburn after a few days.  No odynophagia or dysphagia.    Energy good.  Appetite OK.  Working out 3-4 times a week.    No IBD EIMs.    REVIEW OF SYSTEMS  Complete review of systems otherwise negative per complaint    No Known Allergies    Past Medical History:   Diagnosis Date    Autoimmune hepatitis (Multi)     Autoimmune hepatitis treated with steroids    Encounter for screening for osteoporosis     Osteoporosis screening       Past Surgical History:   Procedure Laterality Date    IR VENOGRAM HEPATIC  6/20/2016    IR VENOGRAM [place]PATIC [place] [facility] [place]    OTHER SURGICAL HISTORY  04/06/2021    Shoulder surgery    US GUIDED NEEDLE LIVER BIOPSY  5/19/2020    US GUIDED NEEDLE LIVER BIOPSY 5[place]19/[place] [facility] [place]       Current Outpatient Medications   Medication Sig Dispense Refill    ascorbic acid (Vitamin C) 125 mg chewable tablet Chew 2 tablets (250 mg) once daily.      cholecalciferol (Vitamin D-3) 125 MCG (5000 UT) capsule Take 1 capsule (125 mcg) by mouth once daily. 90 capsule 3    esomeprazole (NexIUM) 20 mg DR capsule Take 1 capsule (20 mg) by mouth once daily.      levothyroxine (Synthroid, Levoxyl) 150 mcg tablet Take one tablet in the am on an empty stomach 6 days a week, half tablet on 7 th day. 85 tablet 3    mercaptopurine (Purinethol) 50 mg tablet Take 1.5 tablets (75 mg total) by mouth once every day. 45 tablet 11    predniSONE (Deltasone) 1 mg tablet TAKE 4 TABLETS BY MOUTH EVERY  tablet 11    Rinvoq 30 mg tablet extended release 24 hr TAKE 1 TABLET DAILY 30 tablet 11     No current facility-administered medications for this visit.       PHYSICAL EXAM:  /87   Pulse 72   Temp 36.7 °C (98 °F)   Wt 87.2 kg (192 lb 4.8 oz)   SpO2 97%   BMI 28.40 kg/m²   Vital signs reviewed  Patient alert and oriented in no acute distress  Anicteric  No cervical adenopathy  Cardiac exam regular rate and rhythm S1-S2 without murmurs  gallops or rubs  Lungs clear to auscultation bilaterally  Abdomen soft and nontender without organomegaly or mass.  No rebound or guarding.  Bowel sounds present  Extremities without edema  Neurologically grossly intact      Lab Results   Component Value Date    WBC 5.7 12/11/2024    HGB 16.4 12/11/2024    HCT 48.0 12/11/2024     12/11/2024     12/11/2024     Lab Results   Component Value Date    ALT 27 12/11/2024    AST 21 12/11/2024    ALKPHOS 61 12/11/2024    BILITOT 0.4 12/11/2024       === 12/02/24 ===    CT 3D RECONSTRUCTION    - Impression -  No acute intracranial abnormality.    No acute facial bone fracture.      MACRO:  None    Signed by: Veena Posadas 12/2/2024 3:37 AM  Dictation workstation:   [id number]      ASSESSMENT  #ulcerative colitis-patient in clinical remission and, based on last colonoscopy, and endoscopic remission as well from pancolitis.  His remission was linked to initiating upadacitinib therapy.  He is tolerating treatment without difficulty.  Examination and labs are normal.    We discussed conception and fertility.  Although there is limited data on the safety of upadacitinib in pregnancy, there is nothing at this time to suggest it is risky for men to take while trying to conceive.  He will continue current therapy without change.    He had some indefinite for dysplasia last colonoscopy.  Will repeat colonoscopy in June of this year.    # Autoimmune hepatitis-normal liver function tests with minimal fibrosis on low-dose prednisone 3 mg daily and 6-mercaptopurine.  Follows with Dr. Owen.    # GERD-symptoms controlled on low-dose as omeprazole 20 mg daily    PLAN  1) continue upadacitinib (Rinvoq) 30 mg once daily  2) continue esomeprazole 20 mg daily  3) check labs in 5-6 months when you are doing them for Dr. Owen  4) schedule colonoscopy for June 2025 at the [facility]  5) as long as you continue to feel well, follow-up in the office in 1 year  6) call the  office with any change in your condition, questions, or concerns

## 2025-01-21 ENCOUNTER — TELEPHONE (OUTPATIENT)
Dept: PRIMARY CARE | Facility: CLINIC | Age: 30
End: 2025-01-21

## 2025-01-21 ENCOUNTER — OFFICE VISIT (OUTPATIENT)
Dept: GASTROENTEROLOGY | Facility: HOSPITAL | Age: 30
End: 2025-01-21
Payer: COMMERCIAL

## 2025-01-21 VITALS
SYSTOLIC BLOOD PRESSURE: 120 MMHG | WEIGHT: 192.3 LBS | BODY MASS INDEX: 28.4 KG/M2 | DIASTOLIC BLOOD PRESSURE: 87 MMHG | OXYGEN SATURATION: 97 % | TEMPERATURE: 98 F | HEART RATE: 72 BPM

## 2025-01-21 DIAGNOSIS — K75.4 AUTOIMMUNE HEPATITIS (MULTI): ICD-10-CM

## 2025-01-21 DIAGNOSIS — K51.00 ULCERATIVE PANCOLITIS WITHOUT COMPLICATION (MULTI): Primary | ICD-10-CM

## 2025-01-21 PROCEDURE — 99214 OFFICE O/P EST MOD 30 MIN: CPT | Performed by: INTERNAL MEDICINE

## 2025-01-21 PROCEDURE — 1036F TOBACCO NON-USER: CPT | Performed by: INTERNAL MEDICINE

## 2025-01-21 RX ORDER — PREDNISONE 1 MG/1
3 TABLET ORAL DAILY
Qty: 120 TABLET | Refills: 11 | Status: SHIPPED | OUTPATIENT
Start: 2025-01-21

## 2025-01-21 SDOH — ECONOMIC STABILITY: FOOD INSECURITY: WITHIN THE PAST 12 MONTHS, YOU WORRIED THAT YOUR FOOD WOULD RUN OUT BEFORE YOU GOT MONEY TO BUY MORE.: NEVER TRUE

## 2025-01-21 SDOH — ECONOMIC STABILITY: FOOD INSECURITY: WITHIN THE PAST 12 MONTHS, THE FOOD YOU BOUGHT JUST DIDN'T LAST AND YOU DIDN'T HAVE MONEY TO GET MORE.: NEVER TRUE

## 2025-01-21 ASSESSMENT — PAIN SCALES - GENERAL: PAINLEVEL_OUTOF10: 0-NO PAIN

## 2025-01-21 ASSESSMENT — LIFESTYLE VARIABLES
SKIP TO QUESTIONS 9-10: 1
HOW OFTEN DO YOU HAVE SIX OR MORE DRINKS ON ONE OCCASION: NEVER
HOW OFTEN DO YOU HAVE A DRINK CONTAINING ALCOHOL: NEVER
HOW MANY STANDARD DRINKS CONTAINING ALCOHOL DO YOU HAVE ON A TYPICAL DAY: PATIENT DOES NOT DRINK
AUDIT-C TOTAL SCORE: 0

## 2025-01-21 ASSESSMENT — PATIENT HEALTH QUESTIONNAIRE - PHQ9
2. FEELING DOWN, DEPRESSED OR HOPELESS: NOT AT ALL
1. LITTLE INTEREST OR PLEASURE IN DOING THINGS: NOT AT ALL
SUM OF ALL RESPONSES TO PHQ9 QUESTIONS 1 & 2: 0

## 2025-01-21 NOTE — TELEPHONE ENCOUNTER
----- Message from Marine Lee sent at 1/21/2025 10:01 AM EST -----  No pathologic arrhythmia noted.   Low burden of SVE and VE.

## 2025-01-21 NOTE — PATIENT INSTRUCTIONS
"It is great that you continue to feel well on therapy.  As we discussed, we need to repeat a colonoscopy given some changes of \"indefinite for dysplasia\" on your last colonoscopy.  This will be arranged for June of this year.  As we discussed:    PLAN  1) continue upadacitinib (Rinvoq) 30 mg once daily  2) continue esomeprazole 20 mg daily  3) check labs in 5-6 months when you are doing them for Dr. Owen  4) schedule colonoscopy for June 2025 at the [facility]  5) as long as you continue to feel well, follow-up in the office in 1 year  6) call the office with any change in your condition, questions, or concerns  "

## 2025-03-26 DIAGNOSIS — K75.4 AUTOIMMUNE HEPATITIS (MULTI): ICD-10-CM

## 2025-03-26 RX ORDER — PREDNISONE 1 MG/1
4 TABLET ORAL DAILY
Qty: 120 TABLET | Refills: 11 | Status: SHIPPED | OUTPATIENT
Start: 2025-03-26

## 2025-04-01 ENCOUNTER — HOSPITAL ENCOUNTER (OUTPATIENT)
Dept: RADIOLOGY | Facility: CLINIC | Age: 30
Discharge: HOME | End: 2025-04-01
Payer: COMMERCIAL

## 2025-04-01 DIAGNOSIS — Z87.39 HISTORY OF OSTEOPENIA: ICD-10-CM

## 2025-04-01 DIAGNOSIS — E05.00 GRAVES DISEASE: ICD-10-CM

## 2025-04-01 PROCEDURE — 77080 DXA BONE DENSITY AXIAL: CPT | Performed by: RADIOLOGY

## 2025-04-01 PROCEDURE — 77080 DXA BONE DENSITY AXIAL: CPT

## 2025-04-14 DIAGNOSIS — M85.80 OSTEOPENIA, UNSPECIFIED LOCATION: Primary | ICD-10-CM

## 2025-04-15 LAB
ALBUMIN SERPL-MCNC: 4.7 G/DL (ref 3.6–5.1)
BUN SERPL-MCNC: 17 MG/DL (ref 7–25)
BUN/CREAT SERPL: NORMAL (CALC) (ref 6–22)
CALCIUM SERPL-MCNC: 9.7 MG/DL (ref 8.6–10.3)
CHLORIDE SERPL-SCNC: 104 MMOL/L (ref 98–110)
CO2 SERPL-SCNC: 26 MMOL/L (ref 20–32)
CREAT SERPL-MCNC: 0.86 MG/DL (ref 0.6–1.24)
EGFRCR SERPLBLD CKD-EPI 2021: 120 ML/MIN/1.73M2
GLUCOSE SERPL-MCNC: 85 MG/DL (ref 65–99)
PHOSPHATE SERPL-MCNC: 4.1 MG/DL (ref 2.5–4.5)
POTASSIUM SERPL-SCNC: 4 MMOL/L (ref 3.5–5.3)
PTH-INTACT SERPL-MCNC: 31 PG/ML (ref 16–77)
SODIUM SERPL-SCNC: 138 MMOL/L (ref 135–146)
T4 FREE SERPL-MCNC: 1.7 NG/DL (ref 0.8–1.8)

## 2025-04-16 ENCOUNTER — APPOINTMENT (OUTPATIENT)
Dept: ENDOCRINOLOGY | Facility: CLINIC | Age: 30
End: 2025-04-16
Payer: COMMERCIAL

## 2025-04-16 VITALS
DIASTOLIC BLOOD PRESSURE: 81 MMHG | WEIGHT: 189 LBS | HEART RATE: 64 BPM | SYSTOLIC BLOOD PRESSURE: 117 MMHG | BODY MASS INDEX: 27.99 KG/M2 | HEIGHT: 69 IN

## 2025-04-16 DIAGNOSIS — E05.00 GRAVES DISEASE: Primary | ICD-10-CM

## 2025-04-16 PROCEDURE — 3008F BODY MASS INDEX DOCD: CPT | Performed by: INTERNAL MEDICINE

## 2025-04-16 PROCEDURE — 99214 OFFICE O/P EST MOD 30 MIN: CPT | Performed by: INTERNAL MEDICINE

## 2025-04-16 ASSESSMENT — PAIN SCALES - GENERAL: PAINLEVEL_OUTOF10: 0-NO PAIN

## 2025-04-16 NOTE — PROGRESS NOTES
Chief Complaints: Follow up on Grave's disease       30 year old patient with medical history significant for Ulcerative Colitis [in remission], Autoimmune Hepatitis (dx 2016 currently managed with prednisone 3mg daily and mercaptopurine?), and hx of Graves disease s/p VILLAVICENCIO ablation and post ablative hypothyroidism, Graves' orbitopathy well-controlled, osteopenia.     Presents today for 6 month follow up. Last OV was in 10/2024.         Background Hx  03/2018: Diagnosed with primary hypothyroidism during inpatient admission for facial swelling. Labs at that time showed TSH of 125.27 with free T4 of less than 0.10 ng/dL (TPO antibody level was 21 IU/ml and TSI was less than 1.0). He was started initially on levothyroxine 125 mcg, dose was titrated up to 150 mcg daily in June 2018.     02/2020: TFTs showing a TSH: 0.01, and FT4:1.81. QS2mrbs decreased from 125 to 88mcg PO QD.     03/2020: TSH: <0.01, with elevated FT4 of 2.88. Patient symptomatic insomnia, frequent bowel movements and racing heart/palpitations. Levothyroxine was discontinued.     04/2020: Despite discontinuation of LT4 patient remained clinically and biochemically hyperthyroid. TSH of 0.01 and elevated free T4 of 2.23 ng/dL. Patient was started on methimazole 10 mg BID during the last week of April 2020.     Shortly after starting MMI liver enzymes were noted to be elevated, and continued to trend up after repeat testing. Methimazole was discontinued approximately on May 5, 2020 (about 2-3 weeks after starting the medication).     05/2020: Patient admitted (5/18/20) due to worsening transaminitis; patient however remained asymptomatic. Diagnostic radioactive iodine uptake and scan was done on 5/21/20 that showed homogenous increased activity throughout the thyroid gland bilaterally with uptake of 27.4%. Liver biopsy done during hospitalization showed evidence of drug induced hepatotoxicity. He was started on IV glucocorticoids and he was discharged  "home on prednisone 80 mg daily. After discharge patient underwent therapeutic VILLAVICENCIO ablation (5/28/20); with 21.4 millicuries.      07/2020: Post VILLAVICENCIO ablative therapy TFTs showed TSH of 90.11 mIU/L and free T4 of 0.39 ng/dL and patient was started on levothyroxine 112 mcg daily on 7/8/20.      08/2020: Repeat TFTs: TSH: 16, FT4:1.16, LT4 increased to 150mcg PO daily.     10/2020: F/u in clinic, continued on LT4 150mcg PO daily. TFT: TSH:0.41, FT4:1.2     Today: Patient reports that he is feeling well overall.  He denies any eye symptoms like itching, redness or grittiness in eyes.  He has been using artificial tears twice a day.  Denies any vision changes.  Patient denies cold or heat intolerance, constipation or diarrhea, palpitations, muscle spasms or cramps.  He reports stable energy, sleeps 7 to 8 hours/day, weight and appetite has been stable.     Past Medical History:  Diagnosis Date    Autoimmune hepatitis (Multi)     Autoimmune hepatitis treated with steroids    Encounter for screening for osteoporosis     Osteoporosis screening     Surgical History[1]  RX Allergies[2]   Denies alcohol or tobacco use.  Works as a teacher for special education in [place]  Current Outpatient Medications   Medication Instructions    ascorbic acid (Vitamin C) 125 mg chewable tablet 2 tablets, Daily    cholecalciferol (VITAMIN D-3) 125 mcg, oral, Daily    esomeprazole (NexIUM) 20 mg DR capsule 1 capsule, Daily    levothyroxine (Synthroid, Levoxyl) 150 mcg tablet Take one tablet in the am on an empty stomach 6 days a week, half tablet on 7 th day.    mercaptopurine (PURINETHOL) 75 mg, oral, Every Day    predniSONE (DELTASONE) 4 mg, oral, Daily    Rinvoq 30 mg, oral, Daily      Visit Vitals  /81 (BP Location: Right arm, Patient Position: Sitting, BP Cuff Size: Large adult)   Pulse 64   Ht 1.753 m (5' 9\")   Wt 85.7 kg (189 lb)   BMI 27.91 kg/m²   Smoking Status Never   BSA 2.04 m²      EXAM  Constitutional: " well-developed, well-nourished, in no acute distress   Skin: Skin is appropriately warm. Skin color is normal with no suspicious lesions or rashes   Eyes: no lid retraction, no chemosis, right globe more soft to palpation, left globe soft to palpation, no tenderness of globes, EOMI, visual field testing intact, Exophthalmometer measurements : right 22mm, left 22mm ; base: 111, stable  Neck Thyroid: Not palpated   Cardiovascular: Regular rate and rhythm. Normal S1 and S2  Musculoskeletal: No proximal muscle weakness   Neurologic : Moving all extremities spontaneously. No balance or gait disturbances.  No tremors of outstretched hands, DTRs are normal and without delay in return phase  Chvostek sign is negative       Lab Results   Component Value Date    TSH 0.65 04/14/2025    FREET4 1.7 04/14/2025    T3FREE 4.0 04/10/2023    F5ZHDRF 105 04/14/2025    RECE 21 (H) 10/22/2021    THYROIDPAB 21 03/29/2018    TSI 1.9 (H) 10/11/2024      Latest Reference Range & Units Most Recent   CREATININE 0.60 - 1.24 mg/dL 0.86  4/14/25 17:35   EGFR > OR = 60 mL/min/1.73m2 120  4/14/25 17:35   CALCIUM 8.6 - 10.3 mg/dL 9.7  4/14/25 17:35   PHOSPHATE (AS PHOSPHORUS) 2.5 - 4.5 mg/dL 4.1  4/14/25 17:35   ALBUMIN 3.6 - 5.1 g/dL 4.7  4/14/25 17:35   CoCa: 9.1   Latest Reference Range & Units Most Recent   Vitamin D, 25-Hydroxy, Total 30 - 100 ng/mL 104 (H)  12/11/24 10:13       DEXA BONE DENSITY4/1/2025         FINDINGS:  SPINE L1-L4  Bone Mineral Density: 0.956, T-Score -1.2  Z-Score -1.2, was 0.921, -1.5 in 2022  Bone Mineral Density change vs baseline (%):  3.8  Bone Mineral Density change vs previous (%): 3.8      LEFT FEMUR -TOTAL  Bone Mineral Density: 1.046, T-Score 0.1   Z-Score  0., was 0.772 and -1.2 in 2022  Bone Mineral Density change vs baseline (%): 8.2  Bone Mineral Density change vs previous (%): 8.2      LEFT FEMUR -NECK  Bone Mineral Density: 0.852, T-Score -0.6  Z-Score -0.4: was 0.967, -0.4 in 2022      Note:  If no FRAX  score is reported, it is because: Man under age 50      IMPRESSION:  DEXA:  According to World Health Organization criteria,  classification is low bone mass (osteopenia). Follow up recommended in two years or sooner as clinically warranted.    Assessment and plan:  30 year old patient with medical history significant for Ulcerative Colitis [in remission], Autoimmune Hepatitis (dx 2016 currently managed with prednisone 3mg daily and mercaptopurine?), and hx of Graves disease s/p VILLAVICENCIO ablation and post ablative hypothyroidism, Graves' orbitopathy well-controlled, osteopenia.Presents today for 6 month follow up. Last OV was in 10/2024.     #Graves disease s/p VILLAVICENCIO ablation and post ablative hypothyroidism  Clinically and biochemically euthyroid  Continue Tab Levothyroxine 150 mcg daily, empty stomach  Take Nexium 40 to 50 minutes after taking levothyroxine    #Osteoporosis screening in setting of Hx of hyperthyroidism and steroid use  DEXA results reviewed and bone density is stable when compared to bone density in 2022  CoCa 9.1  Continue balanced with appropriate calcium intake  Continue Vit d supplement     Return in 6 months.     The patient was seen and discussed with attending Dr. Rosario.    Berto Urrutia MD  Endocrinology fellow          [1]   Past Surgical History:  Procedure Laterality Date    IR VENOGRAM HEPATIC  6/20/2016    [place] [facility] [place]    OTHER SURGICAL HISTORY  04/06/2021    Shoulder surgery    US GUIDED NEEDLE LIVER BIOPSY  5/19/2020    US GUIDED NEEDLE LIVER BIOPSY [place] [facility] [place]   [2] No Known Allergies

## 2025-04-17 LAB
T3 SERPL-MCNC: 105 NG/DL (ref 76–181)
TSH SERPL-ACNC: 0.65 MIU/L (ref 0.4–4.5)
TSI SER-ACNC: 268 % BASELINE

## 2025-06-24 DIAGNOSIS — E89.0 HYPOTHYROIDISM, POSTABLATIVE: ICD-10-CM

## 2025-06-26 DIAGNOSIS — E89.0 HYPOTHYROIDISM, POSTABLATIVE: ICD-10-CM

## 2025-06-26 RX ORDER — LEVOTHYROXINE SODIUM 150 UG/1
TABLET ORAL
Qty: 85 TABLET | Refills: 3 | Status: SHIPPED | OUTPATIENT
Start: 2025-06-26

## 2025-07-11 DIAGNOSIS — K75.4 AUTOIMMUNE HEPATITIS (MULTI): ICD-10-CM

## 2025-07-11 RX ORDER — MERCAPTOPURINE 50 MG/1
75 TABLET ORAL
Qty: 45 TABLET | Refills: 11 | Status: SHIPPED | OUTPATIENT
Start: 2025-07-11 | End: 2026-07-11

## 2025-08-05 RX ORDER — LEVOTHYROXINE SODIUM 150 UG/1
TABLET ORAL
Qty: 84 TABLET | Refills: 4 | OUTPATIENT
Start: 2025-08-05

## 2025-10-24 ENCOUNTER — APPOINTMENT (OUTPATIENT)
Dept: ENDOCRINOLOGY | Facility: CLINIC | Age: 30
End: 2025-10-24
Payer: COMMERCIAL